# Patient Record
Sex: MALE | Race: WHITE | NOT HISPANIC OR LATINO | Employment: FULL TIME | URBAN - METROPOLITAN AREA
[De-identification: names, ages, dates, MRNs, and addresses within clinical notes are randomized per-mention and may not be internally consistent; named-entity substitution may affect disease eponyms.]

---

## 2017-01-23 ENCOUNTER — OFFICE VISIT (OUTPATIENT)
Dept: INTERNAL MEDICINE | Facility: CLINIC | Age: 42
End: 2017-01-23

## 2017-01-23 VITALS
TEMPERATURE: 98.1 F | OXYGEN SATURATION: 96 % | HEART RATE: 67 BPM | BODY MASS INDEX: 27.7 KG/M2 | DIASTOLIC BLOOD PRESSURE: 80 MMHG | SYSTOLIC BLOOD PRESSURE: 112 MMHG | WEIGHT: 215.8 LBS | HEIGHT: 74 IN | RESPIRATION RATE: 14 BRPM

## 2017-01-23 DIAGNOSIS — K21.9 GASTROESOPHAGEAL REFLUX DISEASE WITHOUT ESOPHAGITIS: Primary | ICD-10-CM

## 2017-01-23 DIAGNOSIS — F41.9 CHRONIC ANXIETY: ICD-10-CM

## 2017-01-23 DIAGNOSIS — M17.32 POST-TRAUMATIC OSTEOARTHRITIS OF LEFT KNEE: ICD-10-CM

## 2017-01-23 DIAGNOSIS — M54.2 NECK PAIN: ICD-10-CM

## 2017-01-23 DIAGNOSIS — G89.21 CHRONIC PAIN DUE TO TRAUMA: ICD-10-CM

## 2017-01-23 PROBLEM — G89.29 CHRONIC PAIN: Status: ACTIVE | Noted: 2017-01-23

## 2017-01-23 PROBLEM — M17.9 OSTEOARTHRITIS, KNEE: Status: ACTIVE | Noted: 2017-01-23

## 2017-01-23 PROCEDURE — 99214 OFFICE O/P EST MOD 30 MIN: CPT | Performed by: INTERNAL MEDICINE

## 2017-01-23 RX ORDER — PANTOPRAZOLE SODIUM 40 MG
40 TABLET, DELAYED RELEASE (ENTERIC COATED) ORAL DAILY
Qty: 90 TABLET | Refills: 1 | Status: SHIPPED | OUTPATIENT
Start: 2017-01-23 | End: 2017-07-18 | Stop reason: SDUPTHER

## 2017-01-23 RX ORDER — FAMOTIDINE 40 MG/1
40 TABLET, FILM COATED ORAL NIGHTLY
Qty: 90 TABLET | Refills: 3 | Status: SHIPPED | OUTPATIENT
Start: 2017-01-23 | End: 2017-10-23

## 2017-01-23 RX ORDER — PANTOPRAZOLE SODIUM 40 MG
1 TABLET, DELAYED RELEASE (ENTERIC COATED) ORAL EVERY MORNING
Refills: 0 | COMMUNITY
Start: 2016-11-16 | End: 2017-01-23

## 2017-01-23 RX ORDER — DULOXETIN HYDROCHLORIDE 30 MG/1
30 CAPSULE, DELAYED RELEASE ORAL DAILY
Qty: 30 CAPSULE | Refills: 1 | Status: SHIPPED | OUTPATIENT
Start: 2017-01-23 | End: 2017-05-04

## 2017-01-23 NOTE — MR AVS SNAPSHOT
Yony Parsons   1/23/2017 4:15 PM   Office Visit    Provider:  Aung Clark MD   Department:  [facility] INTERNAL MEDICINE   Dept Phone:  [phone]                Your Full Care Plan              Today's Medication Changes          These changes are accurate as of: 1/23/17  6:12 PM.  If you have any questions, ask your nurse or doctor.               New Medication(s)Ordered:     DULoxetine 30 MG capsule   Commonly known as:  CYMBALTA   Take 1 capsule by mouth Daily.       famotidine 40 MG tablet   Commonly known as:  PEPCID   Take 1 tablet by mouth Every Night.         Stop taking medication(s)listed here:     lansoprazole 30 MG capsule   Commonly known as:  PREVACID                Where to Get Your Medications      These medications were sent to [facility] - [place], [place] - [id number] - [phone]  - [phone]   [address] [place] [id number]    Hours:  24-hours Phone:  [phone]     DULoxetine 30 MG capsule    famotidine 40 MG tablet                  Your Updated Medication List          This list is accurate as of: 1/23/17  6:12 PM.  Always use your most recent med list.                ALLEGRA ALLERGY 180 MG tablet   Generic drug:  fexofenadine       DULoxetine 30 MG capsule   Commonly known as:  CYMBALTA   Take 1 capsule by mouth Daily.       famotidine 40 MG tablet   Commonly known as:  PEPCID   Take 1 tablet by mouth Every Night.       MENS MULTI VITAMIN & MINERAL PO       montelukast 10 MG tablet   Commonly known as:  SINGULAIR       NASONEX 50 MCG/ACT nasal spray   Generic drug:  mometasone       PROAIR  (90 BASE) MCG/ACT inhaler   Generic drug:  albuterol       PROTONIX 40 MG EC tablet   Generic drug:  pantoprazole       sucralfate 1 G tablet   Commonly known as:  CARAFATE   Take 1 tablet by mouth Every 12 (Twelve) Hours.       TUMS 500 MG chewable tablet   Generic drug:  calcium  "carbonate               You Were Diagnosed With        Codes Comments    Gastroesophageal reflux disease without esophagitis    -  Primary ICD-10-CM: K21.9  ICD-9-CM: 530.81     Post-traumatic osteoarthritis of left knee     ICD-10-CM: M17.32  ICD-9-CM: 715.26       Instructions    1.  Start duloxetine 30 mg each morning - for chronic pain management.    2.  Continue Protonix 40 mg every morning. Take 1/2 tab once or twice weekly as tolerated.    3.  Start famotidine 40 mg at night - for acid suppression.    4.  Use Pepto-Bismol as needed - for indigestion or heartburn.    5.  Use low impact activities - such as cycling and swimming - to protect knee.    6.  Follow-up with orthopedist - for knee pain treatment.    7.  Return in April - fasting checkup.     Patient Instructions History      MyChart Signup     Our records indicate that you have an active [employer] account.    You can view your After Visit Summary by going to [employer] and logging in with your [employer] username and password.  If you don't have a [employer] username and password but a parent or guardian has access to your record, the parent or guardian should login with their own [employer] username and password and access your record to view the After Visit Summary.    If you have questions, you can email [email or web address] or call [phone] to talk to our [employer] staff.  Remember, [employer] is NOT to be used for urgent needs.  For medical emergencies, dial 911.               Other Info from Your Visit           Allergies     No Known Drug Allergy        Reason for Visit     Follow-up           Vital Signs     Blood Pressure Pulse Temperature Respirations Height Weight    112/80 (BP Location: Right arm, Patient Position: Sitting, Cuff Size: Adult) 67 98.1 °F (36.7 °C) (Oral) 14 74\" (188 cm) 215 lb 12.8 oz (97.9 kg)    Oxygen Saturation Body Mass Index Smoking Status             96% 27.71 kg/m2 Former Smoker       "   Problems and Diagnoses Noted     Chronic pain    Acid reflux disease    Degenerative arthritis of knee      No Longer an Issue     Abnormal EKG    Chronic abdominal pain

## 2017-01-23 NOTE — PATIENT INSTRUCTIONS
1.  Start duloxetine 30 mg each morning - for chronic pain management.    2.  Continue Protonix - CHOCO - 40 mg every morning. Take 1/2 tab once or twice weekly as tolerated.    3.  Start famotidine 40 mg at night - for acid suppression.    4.  Use Pepto-Bismol as needed - for indigestion or heartburn.    5.  Use low impact activities - such as cycling and swimming - to protect knee.    6.  Follow-up with orthopedist - for knee pain treatment.    7.  Return in April - fasting checkup.

## 2017-01-23 NOTE — PROGRESS NOTES
Caleb Parsons is a 41 y.o. male.     History of Present Illness     The patient has many years of refractory GERD symptoms.  He's had a remarkable history responding to brand name Protonix but failing multiple other PPIs.  He went through a difficult year last year with relentless pain because his insurance company would not cover the brand name.  He has been on brand name Protonix now for several weeks and is done dramatically better.  He develops GI distress and abdominal pain.  He underwent GI evaluation and received a CT of the abdomen which was negative.  He does not use nonsteroidals and uses very little alcohol.    The patient's had several years progressive left knee pain.  He did undergo a ACL repair in 2008.  He then underwent an ACL repair of the right knee last year.  He has had a long history of sports activities and has had recurring knee injuries.  He now has difficulty with knee pain if he sits up to an hour at a time.  The knees do improve somewhat when he walks.  Nevertheless, by bedtime the knees a chemical great deal and often waking him up at night.  He has had little edema.  He has cut back on high impact activities.  He is not using analgesic medication.    The following portions of the patient's history were reviewed and updated as appropriate: allergies, current medications, past family history, past medical history, past social history, past surgical history and problem list.    Review of Systems   Constitutional: Negative for appetite change and fatigue.   HENT: Negative for ear pain and sore throat.    Respiratory: Negative for cough and shortness of breath.    Cardiovascular: Negative for chest pain and palpitations.   Gastrointestinal: Positive for abdominal distention and abdominal pain. Negative for nausea.   Musculoskeletal: Positive for arthralgias and gait problem. Negative for back pain.   Neurological: Negative for dizziness and headaches.   Psychiatric/Behavioral:  "Positive for sleep disturbance. Negative for dysphoric mood. The patient is nervous/anxious.        Objective   Blood pressure 112/80, pulse 67, temperature 98.1 °F (36.7 °C), temperature source Oral, resp. rate 14, height 74\" (188 cm), weight 215 lb 12.8 oz (97.9 kg), SpO2 96 %.    Physical Exam   Constitutional: He is oriented to person, place, and time.   Cardiovascular: Normal rate, regular rhythm and normal heart sounds.    Pulmonary/Chest: Effort normal and breath sounds normal. He has no wheezes. He has no rales.   Abdominal: Soft. Bowel sounds are normal. He exhibits no distension and no mass.   Moderate epigastric tenderness   Musculoskeletal: Normal range of motion. He exhibits no edema.   Mild tenderness and stiffness of left kidney with good range of motion of both knees all ligaments appear stable   Neurological: He is alert and oriented to person, place, and time. He exhibits normal muscle tone. Coordination normal.   Psychiatric: His behavior is normal. Judgment and thought content normal.   Mildly anxious   Nursing note and vitals reviewed.    Procedures  Assessment/Plan   Yony was seen today for gi problem.    Diagnoses and all orders for this visit:    Gastroesophageal reflux disease without esophagitis    Post-traumatic osteoarthritis of left knee    Chronic anxiety    Chronic pain due to trauma    Other orders  -     DULoxetine (CYMBALTA) 30 MG capsule; Take 1 capsule by mouth Daily.  -     famotidine (PEPCID) 40 MG tablet; Take 1 tablet by mouth Every Night.  -     PROTONIX 40 MG EC tablet; Take 1 tablet by mouth Daily.      The patient's knee pain has become progressively more refractory and inhibiting.  I've asked him to use Tylenol at night although he has failed Tylenol the past.  The patient is not a candidate for nonsteroidals because of his GERD.  Since she is developing increasing fatigue from the chronic pain I've asked him to start Cymbalta.  We may need to adjust the dose from 30 mg " to 60 mg.    The patient's GERD is doing well on Protonix.  I've counseled the patient on long-term safety and asked him to eventually try half tablets a Protonix as tolerated.  He should continue on sucralfate for long-term protection and start famotidine as a safer long-term alternative for symptom control.  It may Take him one or 2 years to get off of Protonix.      Patient Instructions   1.  Start duloxetine 30 mg each morning - for chronic pain management.    2.  Continue Protonix - CHOCO - 40 mg every morning. Take 1/2 tab once or twice weekly as tolerated.    3.  Start famotidine 40 mg at night - for acid suppression.    4.  Use Pepto-Bismol as needed - for indigestion or heartburn.    5.  Use low impact activities - such as cycling and swimming - to protect knee.    6.  Follow-up with orthopedist - for knee pain treatment.    7.  Return in April - fasting checkup.      Electronically signed Aung Clark M.D.1/26/2017 7:01 AM

## 2017-01-26 PROBLEM — F41.9 CHRONIC ANXIETY: Status: ACTIVE | Noted: 2017-01-26

## 2017-02-01 PROBLEM — M54.2 NECK PAIN: Status: ACTIVE | Noted: 2017-02-01

## 2017-02-01 PROBLEM — M54.2 NECK PAIN: Status: RESOLVED | Noted: 2017-02-01 | Resolved: 2017-02-01

## 2017-05-04 ENCOUNTER — OFFICE VISIT (OUTPATIENT)
Dept: INTERNAL MEDICINE | Facility: CLINIC | Age: 42
End: 2017-05-04

## 2017-05-04 VITALS
DIASTOLIC BLOOD PRESSURE: 80 MMHG | SYSTOLIC BLOOD PRESSURE: 112 MMHG | TEMPERATURE: 96.9 F | RESPIRATION RATE: 16 BRPM | HEART RATE: 84 BPM | BODY MASS INDEX: 27.22 KG/M2 | WEIGHT: 212 LBS | OXYGEN SATURATION: 98 %

## 2017-05-04 DIAGNOSIS — M17.32 POST-TRAUMATIC OSTEOARTHRITIS OF LEFT KNEE: ICD-10-CM

## 2017-05-04 DIAGNOSIS — K21.9 GASTROESOPHAGEAL REFLUX DISEASE WITHOUT ESOPHAGITIS: Primary | ICD-10-CM

## 2017-05-04 DIAGNOSIS — R73.01 FASTING HYPERGLYCEMIA: ICD-10-CM

## 2017-05-04 DIAGNOSIS — F41.9 CHRONIC ANXIETY: ICD-10-CM

## 2017-05-04 DIAGNOSIS — K29.50 CHRONIC ANTRAL GASTRITIS: ICD-10-CM

## 2017-05-04 PROBLEM — G89.29 CHRONIC PAIN: Status: RESOLVED | Noted: 2017-01-23 | Resolved: 2017-05-04

## 2017-05-04 LAB
ALBUMIN SERPL-MCNC: 4.6 G/DL (ref 3.2–4.8)
ALBUMIN/GLOB SERPL: 1.5 G/DL (ref 1.5–2.5)
ALP SERPL-CCNC: 53 U/L (ref 25–100)
ALT SERPL-CCNC: 39 U/L (ref 7–40)
AST SERPL-CCNC: 25 U/L (ref 0–33)
BILIRUB SERPL-MCNC: 0.6 MG/DL (ref 0.3–1.2)
BUN SERPL-MCNC: 13 MG/DL (ref 9–23)
BUN/CREAT SERPL: 13 (ref 7–25)
CALCIUM SERPL-MCNC: 10 MG/DL (ref 8.7–10.4)
CHLORIDE SERPL-SCNC: 102 MMOL/L (ref 99–109)
CO2 SERPL-SCNC: 30 MMOL/L (ref 20–31)
CREAT SERPL-MCNC: 1 MG/DL (ref 0.6–1.3)
GLOBULIN SER CALC-MCNC: 3 GM/DL
GLUCOSE SERPL-MCNC: 110 MG/DL (ref 70–100)
HBA1C MFR BLD: 6.2 % (ref 4.8–5.6)
POTASSIUM SERPL-SCNC: 4.8 MMOL/L (ref 3.5–5.5)
PROT SERPL-MCNC: 7.6 G/DL (ref 5.7–8.2)
SODIUM SERPL-SCNC: 140 MMOL/L (ref 132–146)

## 2017-05-04 PROCEDURE — 99214 OFFICE O/P EST MOD 30 MIN: CPT | Performed by: INTERNAL MEDICINE

## 2017-05-10 ENCOUNTER — TELEPHONE (OUTPATIENT)
Dept: INTERNAL MEDICINE | Facility: CLINIC | Age: 42
End: 2017-05-10

## 2017-05-10 DIAGNOSIS — R73.03 PREDIABETES: Primary | ICD-10-CM

## 2017-05-12 ENCOUNTER — PATIENT MESSAGE (OUTPATIENT)
Dept: INTERNAL MEDICINE | Facility: CLINIC | Age: 42
End: 2017-05-12

## 2017-05-24 ENCOUNTER — HOSPITAL ENCOUNTER (OUTPATIENT)
Dept: DIABETES SERVICES | Facility: HOSPITAL | Age: 42
Setting detail: RECURRING SERIES
Discharge: HOME | End: 2017-05-24

## 2017-07-18 RX ORDER — PANTOPRAZOLE SODIUM 40 MG
TABLET, DELAYED RELEASE (ENTERIC COATED) ORAL
Qty: 90 TABLET | Refills: 1 | Status: SHIPPED | OUTPATIENT
Start: 2017-07-18 | End: 2017-10-23 | Stop reason: SDUPTHER

## 2017-08-18 ENCOUNTER — TELEPHONE (OUTPATIENT)
Dept: INTERNAL MEDICINE | Facility: CLINIC | Age: 42
End: 2017-08-18

## 2017-08-18 DIAGNOSIS — Z12.11 COLON CANCER SCREENING: Primary | ICD-10-CM

## 2017-08-18 NOTE — TELEPHONE ENCOUNTER
Pt states he's had maybe 3 diagnostic colonoscopies since having polyps and they've all been clear. Screening colonoscopy auth'd per [employer]. Pt notified an order will be put in and faxed to Dr Hooker.

## 2017-08-18 NOTE — TELEPHONE ENCOUNTER
----- Message from Maxime Luna sent at 8/18/2017 11:14 AM EDT -----  Contact: BECKY  REFERRAL REQUEST:  BECKY HAS SWITCHED INSURANCE TO [employer] PPO AND ALTHOUGH HE CAN MAKE HIS OWN APPT FOR A COLONOSCOPY (FOR HIM IT IS A DIAGNOSTIC, HE HAD POLYPS 10-12 YRS AGO), HE WOULD LIKE A SCREENING COLONOSCOPY- WITH DR FIELDS.  THIS WAY HIS INSURANCE WILL PAY FOR IT. IS THIS SOMETHING [facility] CAN ORDER FOR HIM.  BECKY [phone]

## 2017-10-23 ENCOUNTER — OFFICE VISIT (OUTPATIENT)
Dept: INTERNAL MEDICINE | Facility: CLINIC | Age: 42
End: 2017-10-23

## 2017-10-23 ENCOUNTER — LAB REQUISITION (OUTPATIENT)
Dept: LAB | Facility: HOSPITAL | Age: 42
End: 2017-10-23

## 2017-10-23 DIAGNOSIS — N42.9 DISORDER OF PROSTATE: ICD-10-CM

## 2017-10-23 DIAGNOSIS — Z00.00 ROUTINE GENERAL MEDICAL EXAMINATION AT A HEALTH CARE FACILITY: ICD-10-CM

## 2017-10-23 DIAGNOSIS — R07.89 CHEST TIGHTNESS: ICD-10-CM

## 2017-10-23 DIAGNOSIS — F41.9 CHRONIC ANXIETY: ICD-10-CM

## 2017-10-23 DIAGNOSIS — J30.2 CHRONIC SEASONAL ALLERGIC RHINITIS, UNSPECIFIED TRIGGER: ICD-10-CM

## 2017-10-23 DIAGNOSIS — Z00.00 PREVENTATIVE HEALTH CARE: Primary | ICD-10-CM

## 2017-10-23 DIAGNOSIS — R73.01 FASTING HYPERGLYCEMIA: ICD-10-CM

## 2017-10-23 DIAGNOSIS — K21.9 GASTROESOPHAGEAL REFLUX DISEASE WITHOUT ESOPHAGITIS: ICD-10-CM

## 2017-10-23 DIAGNOSIS — Z12.11 COLON CANCER SCREENING: ICD-10-CM

## 2017-10-23 DIAGNOSIS — K58.9 IRRITABLE BOWEL SYNDROME WITHOUT DIARRHEA: ICD-10-CM

## 2017-10-23 DIAGNOSIS — K29.50 CHRONIC ANTRAL GASTRITIS: ICD-10-CM

## 2017-10-23 DIAGNOSIS — R53.82 CHRONIC FATIGUE: ICD-10-CM

## 2017-10-23 PROCEDURE — 99396 PREV VISIT EST AGE 40-64: CPT | Performed by: INTERNAL MEDICINE

## 2017-10-23 PROCEDURE — 93000 ELECTROCARDIOGRAM COMPLETE: CPT | Performed by: INTERNAL MEDICINE

## 2017-10-23 PROCEDURE — 36415 COLL VENOUS BLD VENIPUNCTURE: CPT | Performed by: INTERNAL MEDICINE

## 2017-10-23 PROCEDURE — 99214 OFFICE O/P EST MOD 30 MIN: CPT | Performed by: INTERNAL MEDICINE

## 2017-10-23 RX ORDER — FAMOTIDINE 40 MG/1
20 TABLET, FILM COATED ORAL NIGHTLY
Qty: 90 TABLET | Refills: 3
Start: 2017-10-23 | End: 2019-10-17

## 2017-10-23 RX ORDER — PANTOPRAZOLE SODIUM 40 MG
40 TABLET, DELAYED RELEASE (ENTERIC COATED) ORAL DAILY
Qty: 90 TABLET | Refills: 1
Start: 2017-10-23 | End: 2017-11-01 | Stop reason: SDUPTHER

## 2017-10-23 RX ORDER — SUCRALFATE 1 G/1
0.5 TABLET ORAL EVERY 12 HOURS
Qty: 90 TABLET | Refills: 3 | Status: SHIPPED | OUTPATIENT
Start: 2017-10-23 | End: 2019-07-31 | Stop reason: SDUPTHER

## 2017-10-23 NOTE — PROGRESS NOTES
Caleb Parsons is a 42 y.o. male.     Chief Complaint   Patient presents with   • Annual Exam   • Heartburn       History of Present Illness     The patient has an 11 year history of severe refractory GERD.  He has failed all medications over the years except for brand name Protonix.  With great difficulty this has been available through the preauthorization.  He does remarkably better on this product and has had minimal symptoms in recent months.  He had a severe flare earlier this year.  His condition is complicated by antral gastritis and irritable bowel syndrome.  He has avoided fried foods and spicy foods which minimize his GI distress.    The following portions of the patient's history were reviewed and updated as appropriate: allergies, current medications, past family history, past medical history, past social history, past surgical history and problem list.    Active Ambulatory Problems     Diagnosis Date Noted   • Atopic rhinitis 08/16/2016   • Chronic antral gastritis 08/16/2016   • Gastroesophageal reflux disease 08/16/2016   • Irritable bowel syndrome 08/16/2016   • Disorder of prostate 08/16/2016   • Preventative health care 08/18/2016   • Osteoarthritis, knee 01/23/2017   • Chronic anxiety 01/26/2017   • Fasting hyperglycemia 05/04/2017     Resolved Ambulatory Problems     Diagnosis Date Noted   • Abnormal electrocardiogram 08/16/2016   • Serum creatinine raised 08/16/2016   • Detrusor muscle hypertonia 08/16/2016   • Pain in pelvis 08/16/2016   • Chronic abdominal pain 10/17/2016   • Chronic pain 01/23/2017   • Neck pain 02/01/2017     Past Medical History:   Diagnosis Date   • Allergic rhinitis    • Anal fissure 2006   • Chronic gastric erosion    • Colon adenomas 2007   • GERD (gastroesophageal reflux disease) 2006   • IBS (irritable bowel syndrome) 2006   • Overactive bladder 2013   • Pelvic pain    • Prediabetes 2017     Past Surgical History:   Procedure Laterality Date   •  APPENDECTOMY  1992   • CHOLECYSTECTOMY  2006   • COLONOSCOPY W/ POLYPECTOMY  2007    With rectal adenoma   • KNEE ARTHROSCOPY Right 2016    ACL repair   • KNEE SURGERY Left 2008    ACL repair     Family History   Problem Relation Age of Onset   • Diabetes type II Mother 60   • Prostate cancer Maternal Grandfather    • No Known Problems Father    • No Known Problems Sister    • No Known Problems Brother    • Lung cancer Paternal Grandfather      Social History     Social History   • Marital status:      Spouse name: N/A   • Number of children: N/A   • Years of education: N/A     Occupational History   • Not on file.     Social History Main Topics   • Smoking status: Former Smoker     Packs/day: 0.10     Years: 10.00     Types: Cigarettes     Start date: 1997     Quit date: 2007   • Smokeless tobacco: Never Used   • Alcohol use 3.6 oz/week     6 Shots of liquor per week   • Drug use: No   • Sexual activity: Not on file     Other Topics Concern   • Not on file     Social History Narrative    Domestic life     lives with wife and daughter        Taoist    Evangelical        Sleep hygiene   in bed 11 PM to 7 AM for 7 hours broken sleep         Caffeine use   1 cup of coffee or tea daily        Exercise habits    Upper body strengthening.  Elliptical Glider 3 days weekly        Diet     low-calorie diabetic diet - low in salt low in sugar -  yogurt daily        Occupation      - 50 hours weekly        Hearing  no  impairment        Vision    corrects with reading glasses         Driving    no limitation             Review of Systems   Constitutional: Negative for appetite change and fatigue.   HENT: Positive for congestion and sneezing. Negative for ear pain and sore throat.    Eyes: Negative for itching and visual disturbance.   Respiratory: Positive for chest tightness. Negative for cough and shortness of breath.    Cardiovascular: Negative for chest pain and palpitations.   Gastrointestinal:  "Negative for abdominal pain and nausea.   Endocrine: Negative for cold intolerance and heat intolerance.   Genitourinary: Negative for dysuria and hematuria.   Musculoskeletal: Negative for arthralgias and back pain.   Skin: Negative for rash and wound.   Allergic/Immunologic: Negative for environmental allergies and food allergies.   Neurological: Negative for dizziness and headaches.   Hematological: Negative for adenopathy. Does not bruise/bleed easily.   Psychiatric/Behavioral: Positive for sleep disturbance. The patient is nervous/anxious.        Objective   Blood pressure 110/80, pulse 64, temperature 97.2 °F (36.2 °C), resp. rate 12, height 74\" (188 cm), weight 197 lb (89.4 kg), SpO2 98 %.    Physical Exam   Constitutional: He is oriented to person, place, and time. He appears well-developed and well-nourished. No distress.   HENT:   Right Ear: External ear normal.   Left Ear: External ear normal.   Nose: Nose normal.   Mouth/Throat: Oropharynx is clear and moist.   Eyes: EOM are normal. Pupils are equal, round, and reactive to light. No scleral icterus.   Neck: Normal range of motion. Neck supple. No JVD present. No thyromegaly present.   Cardiovascular: Normal rate, regular rhythm, normal heart sounds and intact distal pulses.    No murmur heard.  Pulmonary/Chest: Effort normal and breath sounds normal. He has no wheezes. He has no rales.   Abdominal: Soft. Bowel sounds are normal. He exhibits no distension and no mass. No hernia.   Mild epigastric tenderness   Genitourinary: Rectum normal, prostate normal and penis normal.   Genitourinary Comments: Testicles normal   Musculoskeletal: Normal range of motion. He exhibits no edema or tenderness.   Lymphadenopathy:     He has no cervical adenopathy.   Neurological: He is alert and oriented to person, place, and time. He displays normal reflexes. No cranial nerve deficit. He exhibits normal muscle tone. Coordination normal.   Vibratory normal  Romberg " negative  Gait normal  Plantars downgoing     Skin: Skin is warm and dry. No rash noted.   Psychiatric: He has a normal mood and affect. His behavior is normal. Judgment and thought content normal.   Mildly anxious   Nursing note and vitals reviewed.      ECG 12 Lead  Date/Time: 10/23/2017 8:40 AM  Performed by: AUNG CLARK  Authorized by: AUNG CLARK   Interpreted by ED physician: Aung Clark M.D.  Comparison: compared with previous ECG from 10/17/2016  Similar to previous ECG  Rhythm: sinus rhythm  Rate: normal  BPM: 50  Conduction: conduction normal  ST Segments: ST segments normal  T Waves: T waves normal  QRS axis: normal  Other: no other findings  Clinical impression: normal ECG  Comments: Indication - chest tightness  Baseline EKG          Assessment/Plan   Yony was seen today for annual exam and heartburn.    Diagnoses and all orders for this visit:    Preventative health care    Chronic anxiety    Disorder of prostate  -     Urinalysis With / Microscopic If Indicated - Urine, Clean Catch    Fasting hyperglycemia  -     Hemoglobin A1c    Irritable bowel syndrome without diarrhea  -     CBC & Differential  -     Comprehensive Metabolic Panel  -     C-reactive Protein    Gastroesophageal reflux disease without esophagitis    Chronic antral gastritis    Chronic seasonal allergic rhinitis, unspecified trigger    Chronic fatigue  -     Testosterone  -     TSH    Chest tightness  -     ECG 12 Lead    Colon cancer screening  -     Ambulatory Referral For Screening Colonoscopy    Other orders  -     sucralfate (CARAFATE) 1 g tablet; Take 0.5 tablets by mouth Every 12 (Twelve) Hours.  -     famotidine (PEPCID) 40 MG tablet; Take 0.5 tablets by mouth Every Night.  -     PROTONIX 40 MG EC tablet; Take 1 tablet by mouth Daily.      The patient's GERD is in remission in recent months.  He should gradually reduce Protonix dose to 20 mg for long-term safety.  I reaffirmed his need for famotidine and  sucralfate for ancillary treatment which will minimize his reliance on PPIs.    The patient has been prediabetic in over the last year and he has undergone dietary counseling.  His weight is down 15 pounds this year and his BMI is dropped from 6.2-5.1.  I've counseled him on excellent control of his diet and weight through his lifetime prevent diabetic complications.    The patient has severe refractory respiratory allergies.  He is done much better in the last year greatly related to environmental control.  He will need to keep Singulair and albuterol in reserve for severe allergy flares.  Nasonex and Allegra will be excellent first-line treatment.    The patient has had colon and rectal adenomas at a young age.  He is ready now for a 5 year repeat in colonoscopy.    The preventive exam has been reviewed in detail.  The patient has been fully counseled on preventative guidelines for vaccines, cancer screenings, and other health maintenance needs.   The patient has been counseled on guidelines for maintaining a lifestyle to promote good health and to minimize chronic diseases.  The patient has been assisted with scheduling these healthcare procedures for the coming year and given a written document outlining these recommendations.    Patient Instructions   1.  Limit Protonix - no more than  20 mg daily - as much as possible.    2.  Resume famotidine 20 mg daily - for stomach protection.    3.  Resume sucralfate 500 mg every 12 hours - for stomach protection.    4.  Follow a diabetic diet - low in salt, low in sugar, low in fat in spices.    5.  Maintain a low impact exercise program = every week.    6.  Colonoscopy this fall - for cancer screening.    7.  Return visit 6 months - fasting checkup.    8.  All laboratory tests are acceptable and require no change in treatment.      Current Outpatient Prescriptions:   •  calcium carbonate (TUMS) 500 MG chewable tablet, Chew 1 tablet As Needed., Disp: , Rfl:   •   famotidine (PEPCID) 40 MG tablet, Take 0.5 tablets by mouth Every Night., Disp: 90 tablet, Rfl: 3  •  fexofenadine (ALLEGRA ALLERGY) 180 MG tablet, Take 1 tablet by mouth Daily As Needed., Disp: , Rfl:   •  mometasone (NASONEX) 50 MCG/ACT nasal spray, 1 spray into each nostril Daily As Needed., Disp: , Rfl:   •  montelukast (SINGULAIR) 10 MG tablet, Take 1 tablet by mouth Daily As Needed. During allergy season, Disp: , Rfl:   •  Multiple Vitamins-Minerals (MENS MULTI VITAMIN & MINERAL PO), Take 1 tablet by mouth Every Morning., Disp: , Rfl:   •  PROTONIX 40 MG EC tablet, Take 1 tablet by mouth Daily., Disp: 90 tablet, Rfl: 1  •  sucralfate (CARAFATE) 1 g tablet, Take 0.5 tablets by mouth Every 12 (Twelve) Hours., Disp: 90 tablet, Rfl: 3    Allergies   Allergen Reactions   • No Known Drug Allergy        Immunization History   Administered Date(s) Administered   • Influenza, Quadrivalent 09/30/2015, 09/03/2016   • Tdap 10/15/2014       Electronically signed Aung Clark M.D.10/24/2017 6:53 AM

## 2017-10-23 NOTE — PATIENT INSTRUCTIONS
1.  Limit Protonix - no more than  20 mg daily - as much as possible.    2.  Resume famotidine 20 mg daily - for stomach protection.    3.  Resume sucralfate 500 mg every 12 hours - for stomach protection.    4.  Follow a diabetic diet - low in salt, low in sugar, low in fat in spices.    5.  Maintain a low impact exercise program = every week.    6.  Colonoscopy this fall - for cancer screening.    7.  Return visit 6 months - fasting checkup.

## 2017-10-24 VITALS
TEMPERATURE: 97.2 F | HEIGHT: 74 IN | HEART RATE: 64 BPM | SYSTOLIC BLOOD PRESSURE: 110 MMHG | RESPIRATION RATE: 12 BRPM | DIASTOLIC BLOOD PRESSURE: 80 MMHG | OXYGEN SATURATION: 98 % | WEIGHT: 197 LBS | BODY MASS INDEX: 25.28 KG/M2

## 2017-10-24 LAB
ALBUMIN SERPL-MCNC: 4.3 G/DL (ref 3.2–4.8)
ALBUMIN/GLOB SERPL: 1.5 G/DL (ref 1.5–2.5)
ALP SERPL-CCNC: 43 U/L (ref 25–100)
ALT SERPL-CCNC: 25 U/L (ref 7–40)
APPEARANCE UR: CLEAR
AST SERPL-CCNC: 25 U/L (ref 0–33)
BASOPHILS # BLD AUTO: 0.05 10*3/MM3 (ref 0–0.2)
BASOPHILS NFR BLD AUTO: 0.9 % (ref 0–1)
BILIRUB SERPL-MCNC: 0.7 MG/DL (ref 0.3–1.2)
BILIRUB UR QL STRIP: NEGATIVE
BUN SERPL-MCNC: 17 MG/DL (ref 9–23)
BUN/CREAT SERPL: 17 (ref 7–25)
CALCIUM SERPL-MCNC: 9 MG/DL (ref 8.7–10.4)
CHLORIDE SERPL-SCNC: 99 MMOL/L (ref 99–109)
CO2 SERPL-SCNC: 28 MMOL/L (ref 20–31)
COLOR UR: YELLOW
CREAT SERPL-MCNC: 1 MG/DL (ref 0.6–1.3)
CRP SERPL-MCNC: 0.01 MG/DL (ref 0–1)
EOSINOPHIL # BLD AUTO: 0.08 10*3/MM3 (ref 0–0.3)
EOSINOPHIL NFR BLD AUTO: 1.4 % (ref 0–3)
ERYTHROCYTE [DISTWIDTH] IN BLOOD BY AUTOMATED COUNT: 13.5 % (ref 11.3–14.5)
GFR SERPLBLD CREATININE-BSD FMLA CKD-EPI: 82 ML/MIN/1.73
GFR SERPLBLD CREATININE-BSD FMLA CKD-EPI: 99 ML/MIN/1.73
GLOBULIN SER CALC-MCNC: 2.8 GM/DL
GLUCOSE SERPL-MCNC: 95 MG/DL (ref 70–100)
GLUCOSE UR QL: NEGATIVE
HBA1C MFR BLD: 5.1 % (ref 4.8–5.6)
HCT VFR BLD AUTO: 43.3 % (ref 38.9–50.9)
HGB BLD-MCNC: 14.4 G/DL (ref 13.1–17.5)
HGB UR QL STRIP: NEGATIVE
IMM GRANULOCYTES # BLD: 0.01 10*3/MM3 (ref 0–0.03)
IMM GRANULOCYTES NFR BLD: 0.2 % (ref 0–0.6)
KETONES UR QL STRIP: NEGATIVE
LEUKOCYTE ESTERASE UR QL STRIP: NEGATIVE
LYMPHOCYTES # BLD AUTO: 2.31 10*3/MM3 (ref 0.6–4.8)
LYMPHOCYTES NFR BLD AUTO: 39.7 % (ref 24–44)
MCH RBC QN AUTO: 31.5 PG (ref 27–31)
MCHC RBC AUTO-ENTMCNC: 33.3 G/DL (ref 32–36)
MCV RBC AUTO: 94.7 FL (ref 80–99)
MONOCYTES # BLD AUTO: 0.57 10*3/MM3 (ref 0–1)
MONOCYTES NFR BLD AUTO: 9.8 % (ref 0–12)
NEUTROPHILS # BLD AUTO: 2.8 10*3/MM3 (ref 1.5–8.3)
NEUTROPHILS NFR BLD AUTO: 48 % (ref 41–71)
NITRITE UR QL STRIP: NEGATIVE
PH UR STRIP: 6.5 [PH] (ref 5–8)
PLATELET # BLD AUTO: 208 10*3/MM3 (ref 150–450)
POTASSIUM SERPL-SCNC: 4.6 MMOL/L (ref 3.5–5.5)
PROT SERPL-MCNC: 7.1 G/DL (ref 5.7–8.2)
PROT UR QL STRIP: NEGATIVE
RBC # BLD AUTO: 4.57 10*6/MM3 (ref 4.2–5.76)
SODIUM SERPL-SCNC: 132 MMOL/L (ref 132–146)
SP GR UR: 1.01 (ref 1–1.03)
TESTOST SERPL-MCNC: 436 NG/DL (ref 264–916)
TSH SERPL DL<=0.005 MIU/L-ACNC: 1.07 MIU/ML (ref 0.35–5.35)
UROBILINOGEN UR STRIP-MCNC: NORMAL MG/DL
WBC # BLD AUTO: 5.82 10*3/MM3 (ref 3.5–10.8)

## 2017-10-25 ENCOUNTER — TELEPHONE (OUTPATIENT)
Dept: INTERNAL MEDICINE | Facility: CLINIC | Age: 42
End: 2017-10-25

## 2017-11-01 ENCOUNTER — TELEPHONE (OUTPATIENT)
Dept: INTERNAL MEDICINE | Facility: CLINIC | Age: 42
End: 2017-11-01

## 2017-11-01 RX ORDER — PANTOPRAZOLE SODIUM 40 MG
40 TABLET, DELAYED RELEASE (ENTERIC COATED) ORAL DAILY
Qty: 90 TABLET | Refills: 1 | Status: SHIPPED | OUTPATIENT
Start: 2017-11-01 | End: 2018-05-14 | Stop reason: SDUPTHER

## 2017-11-01 NOTE — TELEPHONE ENCOUNTER
----- Message from Maxime Luna sent at 11/1/2017  9:44 AM EDT -----  Contact: Sinan  Med Renewal:  Protonics 40 mg (has to be name brand) 90 day with 1 refill.  He as new insurance).  [facility].  [facility] [phone]

## 2018-04-24 ENCOUNTER — OFFICE VISIT (OUTPATIENT)
Dept: INTERNAL MEDICINE | Facility: CLINIC | Age: 43
End: 2018-04-24

## 2018-04-24 VITALS
BODY MASS INDEX: 25.16 KG/M2 | WEIGHT: 196 LBS | TEMPERATURE: 98.5 F | HEART RATE: 68 BPM | OXYGEN SATURATION: 97 % | SYSTOLIC BLOOD PRESSURE: 96 MMHG | DIASTOLIC BLOOD PRESSURE: 70 MMHG | RESPIRATION RATE: 16 BRPM

## 2018-04-24 DIAGNOSIS — K58.9 IRRITABLE BOWEL SYNDROME WITHOUT DIARRHEA: ICD-10-CM

## 2018-04-24 DIAGNOSIS — R73.01 FASTING HYPERGLYCEMIA: ICD-10-CM

## 2018-04-24 DIAGNOSIS — K21.9 GASTROESOPHAGEAL REFLUX DISEASE WITHOUT ESOPHAGITIS: ICD-10-CM

## 2018-04-24 DIAGNOSIS — F41.9 CHRONIC ANXIETY: ICD-10-CM

## 2018-04-24 DIAGNOSIS — K29.50 CHRONIC ANTRAL GASTRITIS: ICD-10-CM

## 2018-04-24 DIAGNOSIS — J30.2 CHRONIC SEASONAL ALLERGIC RHINITIS, UNSPECIFIED TRIGGER: Primary | ICD-10-CM

## 2018-04-24 PROCEDURE — 99214 OFFICE O/P EST MOD 30 MIN: CPT | Performed by: INTERNAL MEDICINE

## 2018-04-24 NOTE — PROGRESS NOTES
Caleb Parsons is a 43 y.o. male.     History of Present Illness     The patient's had a greater than 10 year history of severe recurring GERD.  His GERD is responded to brand name Protonix.  He has required preauthorization for this product since all other generic and brand name products failed.  She has been much more comfortable last 12 months.  He has associated degree of recurring gastritis and irritable bowel syndrome.  He has been following FODMAP guidelines with some success.    The following portions of the patient's history were reviewed and updated as appropriate: allergies, current medications, past family history, past medical history, past social history, past surgical history and problem list.    Review of Systems   Constitutional: Negative for appetite change and fatigue.   HENT: Positive for congestion and sneezing. Negative for ear pain and sore throat.    Respiratory: Negative for cough and shortness of breath.    Cardiovascular: Negative for chest pain and palpitations.   Gastrointestinal: Positive for abdominal distention and abdominal pain. Negative for nausea.   Musculoskeletal: Negative for arthralgias and back pain.   Neurological: Negative for dizziness and headaches.       Objective   Blood pressure 96/70, pulse 68, temperature 98.5 °F (36.9 °C), temperature source Oral, resp. rate 16, weight 88.9 kg (196 lb), SpO2 97 %.    Physical Exam   Constitutional: He is oriented to person, place, and time. He appears well-developed and well-nourished. No distress.   HENT:   Right Ear: External ear normal.   Left Ear: External ear normal.   Mouth/Throat: Oropharynx is clear and moist.   Cardiovascular: Normal rate, regular rhythm and normal heart sounds.    Pulmonary/Chest: Effort normal and breath sounds normal. He has no wheezes. He has no rales.   Neurological: He is alert and oriented to person, place, and time. He exhibits normal muscle tone. Coordination normal.   Psychiatric: He has  a normal mood and affect. His behavior is normal. Judgment and thought content normal.   Nursing note and vitals reviewed.    Procedures  Assessment/Plan   Yony was seen today for preventative.    Diagnoses and all orders for this visit:    Chronic seasonal allergic rhinitis, unspecified trigger    Chronic antral gastritis    Gastroesophageal reflux disease without esophagitis  -     Comprehensive Metabolic Panel    Irritable bowel syndrome without diarrhea    Fasting hyperglycemia  -     Hemoglobin A1c    Chronic anxiety      The patient has severe refractory GERD which has stabilized the last year or on Protonix 40 mg daily.  For long-term safety concerns I've asked him to gradually use Protonix 20 mg a few days weekly and finds his lowest effective dose.  He should continue on ranitidine and sucralfate to minimize Protonix use.     Patient is prediabetic with a hemoglobin A1c a year ago of 6.2%.  He still has fasting hyperglycemia but has normalized hemoglobin A1c's on a diabetic diet and weight control.    The patient has significant seasonal allergic rhinitis.  I've asked him to use [place] cast consistently through active seasons.  He should use Allegra and Nasonex as needed.    Patient Instructions   1.  Proceed with gradual wean of Protonix - 20 mg daily.    2.  Follow well-balanced diabetic diet - maintain current weight.    3.  Maintain a regular exercise program - every week.    4.  Continue usual medicines and supplements - as listed.    5.  Return visit 6 months - annual checkup.    6.  Hemoglobin A1c remains normal at 5.6%.    7.  Return panel is normal except for mild elevation in glucose.    Electronically signed Aung Clark M.D.4/26/2018 7:32 PM

## 2018-04-24 NOTE — PATIENT INSTRUCTIONS
1.  Proceed with gradual wean of Protonix - 20 mg daily.    2.  Follow well-balanced diabetic diet - maintain current weight.    3.  Maintain a regular exercise program - every week.    4.  Continue usual medicines and supplements - as listed.    5.  Return visit 6 months - annual checkup.

## 2018-04-26 ENCOUNTER — APPOINTMENT (OUTPATIENT)
Dept: LAB | Facility: HOSPITAL | Age: 43
End: 2018-04-26

## 2018-04-26 LAB
ALBUMIN SERPL-MCNC: 4.3 G/DL (ref 3.2–4.8)
ALBUMIN/GLOB SERPL: 1.5 G/DL (ref 1.5–2.5)
ALP SERPL-CCNC: 43 U/L (ref 25–100)
ALT SERPL W P-5'-P-CCNC: 29 U/L (ref 7–40)
ANION GAP SERPL CALCULATED.3IONS-SCNC: 6 MMOL/L (ref 3–11)
AST SERPL-CCNC: 32 U/L (ref 0–33)
BILIRUB SERPL-MCNC: 0.8 MG/DL (ref 0.3–1.2)
BUN BLD-MCNC: 14 MG/DL (ref 9–23)
BUN/CREAT SERPL: 14 (ref 7–25)
CALCIUM SPEC-SCNC: 9 MG/DL (ref 8.7–10.4)
CHLORIDE SERPL-SCNC: 103 MMOL/L (ref 99–109)
CO2 SERPL-SCNC: 30 MMOL/L (ref 20–31)
CREAT BLD-MCNC: 1 MG/DL (ref 0.6–1.3)
GFR SERPL CREATININE-BSD FRML MDRD: 81 ML/MIN/1.73
GLOBULIN UR ELPH-MCNC: 2.8 GM/DL
GLUCOSE BLD-MCNC: 101 MG/DL (ref 70–100)
HBA1C MFR BLD: 5.6 % (ref 4.8–5.6)
POTASSIUM BLD-SCNC: 4.5 MMOL/L (ref 3.5–5.5)
PROT SERPL-MCNC: 7.1 G/DL (ref 5.7–8.2)
SODIUM BLD-SCNC: 139 MMOL/L (ref 132–146)

## 2018-04-26 PROCEDURE — 83036 HEMOGLOBIN GLYCOSYLATED A1C: CPT | Performed by: INTERNAL MEDICINE

## 2018-04-26 PROCEDURE — 80053 COMPREHEN METABOLIC PANEL: CPT | Performed by: INTERNAL MEDICINE

## 2018-04-26 PROCEDURE — 36415 COLL VENOUS BLD VENIPUNCTURE: CPT | Performed by: INTERNAL MEDICINE

## 2018-04-30 ENCOUNTER — TELEPHONE (OUTPATIENT)
Dept: INTERNAL MEDICINE | Facility: CLINIC | Age: 43
End: 2018-04-30

## 2018-04-30 NOTE — TELEPHONE ENCOUNTER
Left message re: labs.  Per TGF - Hemoglobin A1c remains normal at 5.6%. Chem panel is normal except for mild elevation in glucose.  Enc to call if questions or concerns.

## 2018-05-14 RX ORDER — PANTOPRAZOLE SODIUM 40 MG
TABLET, DELAYED RELEASE (ENTERIC COATED) ORAL
Qty: 90 TABLET | Refills: 1 | Status: SHIPPED | OUTPATIENT
Start: 2018-05-14 | End: 2018-10-29 | Stop reason: SDUPTHER

## 2018-05-22 ENCOUNTER — TELEPHONE (OUTPATIENT)
Dept: INTERNAL MEDICINE | Facility: CLINIC | Age: 43
End: 2018-05-22

## 2018-05-22 ENCOUNTER — OFFICE VISIT (OUTPATIENT)
Dept: INTERNAL MEDICINE | Facility: CLINIC | Age: 43
End: 2018-05-22

## 2018-05-22 VITALS
BODY MASS INDEX: 24.73 KG/M2 | WEIGHT: 192.6 LBS | DIASTOLIC BLOOD PRESSURE: 70 MMHG | TEMPERATURE: 97.7 F | SYSTOLIC BLOOD PRESSURE: 100 MMHG | OXYGEN SATURATION: 98 % | RESPIRATION RATE: 16 BRPM | HEART RATE: 72 BPM

## 2018-05-22 DIAGNOSIS — B02.9 HERPES ZOSTER WITHOUT COMPLICATION: Primary | ICD-10-CM

## 2018-05-22 PROBLEM — M79.2 NEURALGIA: Status: ACTIVE | Noted: 2018-05-22

## 2018-05-22 PROCEDURE — 99213 OFFICE O/P EST LOW 20 MIN: CPT | Performed by: INTERNAL MEDICINE

## 2018-05-22 RX ORDER — GABAPENTIN 100 MG/1
200-300 CAPSULE ORAL NIGHTLY
Qty: 30 CAPSULE | Refills: 3 | OUTPATIENT
Start: 2018-05-22 | End: 2018-10-29

## 2018-05-22 RX ORDER — VALACYCLOVIR HYDROCHLORIDE 1 G/1
1000 TABLET, FILM COATED ORAL 3 TIMES DAILY
Qty: 15 TABLET | Refills: 0 | Status: SHIPPED | OUTPATIENT
Start: 2018-05-22 | End: 2023-02-28

## 2018-05-22 NOTE — TELEPHONE ENCOUNTER
Pt is calling feels most sure he has a case of the shingles - would like to come see tgf to to make 100 % sure and get on meds asap - or can we just call something in?

## 2018-05-22 NOTE — PATIENT INSTRUCTIONS
1.  Start valacyclovir every 8 hours for 15 doses.    2.  Take gabapentin 200-300 mg at bedtime to treat neuralgia.    3.  Take gabapentin 1 or 2 capsules twice daily as needed for neuralgia.    4.  Use Tylenol as needed for pain.    5.  Next checkup as scheduled.

## 2018-05-22 NOTE — PROGRESS NOTES
Subjective   Yony Parsons is a 43 y.o. male.     History of Present Illness     The patient's had 3 days burning pain and 24 hours of blistery rash on the right lower back.  He is sure he has the shingles.  He was uncomfortable through much of the night last night.      The following portions of the patient's history were reviewed and updated as appropriate: allergies, current medications, past family history, past medical history, past social history, past surgical history and problem list.    Review of Systems   Constitutional: Negative for appetite change and fatigue.   HENT: Negative for ear pain and sore throat.    Respiratory: Negative for cough and shortness of breath.    Cardiovascular: Negative for chest pain and palpitations.   Gastrointestinal: Negative for abdominal pain and nausea.   Musculoskeletal: Negative for arthralgias and back pain.   Neurological: Negative for dizziness and headaches.       Objective   Blood pressure 100/70, pulse 72, temperature 97.7 °F (36.5 °C), temperature source Oral, resp. rate 16, weight 87.4 kg (192 lb 9.6 oz), SpO2 98 %.    Physical Exam   Constitutional: He is oriented to person, place, and time. He appears well-developed and well-nourished.   Neurological: He is alert and oriented to person, place, and time. He exhibits normal muscle tone. Coordination normal.   Skin: Skin is warm and dry.   The patient has clusters of typical shingles on the right lower back and flank in the T10 and T12 dermatomes.   Psychiatric: He has a normal mood and affect. Judgment and thought content normal.   Vitals reviewed.    Procedures  Assessment/Plan   Yony was seen today for rash.    Diagnoses and all orders for this visit:    Herpes zoster without complication    Other orders  -     valACYclovir (VALTREX) 1000 MG tablet; Take 1 tablet by mouth 3 (Three) Times a Day.  -     gabapentin (NEURONTIN) 100 MG capsule; Take 2-3 capsules by mouth Every Night. + 1-2 capsules twice daily for  neuralgia       the patient does have blistery rash in the right lower back consistent with acute shingles.  Remarkably, it is president to [employer]ol[employer].      He has had moderate neuralgia and sleep disturbance and I've encouraged him to use gabapentin over the next few weeks as needed.     Patient Instructions   1.  Start valacyclovir every 8 hours for 15 doses.    2.  Take gabapentin 200-300 mg at bedtime to treat neuralgia.    3.  Take gabapentin 1 or 2 capsules twice daily as needed for neuralgia.    4.  Use Tylenol as needed for pain.    5.  Next checkup as scheduled.    Electronically signed Aung Clark M.D.5/24/2018 8:02 PM

## 2018-05-22 NOTE — TELEPHONE ENCOUNTER
I called pt. He said starting Fri it felt like a pulled muscle at his right lower back. Clustery rashes developed yesterday. Per TGF, pt to come to office now for assessment.

## 2018-05-24 RX ORDER — PANTOPRAZOLE SODIUM 40 MG
TABLET, DELAYED RELEASE (ENTERIC COATED) ORAL
Qty: 90 TABLET | Refills: 1 | OUTPATIENT
Start: 2018-05-24

## 2018-10-29 ENCOUNTER — OFFICE VISIT (OUTPATIENT)
Dept: FAMILY MEDICINE CLINIC | Facility: CLINIC | Age: 43
End: 2018-10-29

## 2018-10-29 VITALS
HEIGHT: 74 IN | DIASTOLIC BLOOD PRESSURE: 69 MMHG | BODY MASS INDEX: 24.38 KG/M2 | SYSTOLIC BLOOD PRESSURE: 100 MMHG | HEART RATE: 74 BPM | WEIGHT: 190 LBS | OXYGEN SATURATION: 98 %

## 2018-10-29 DIAGNOSIS — Z00.00 PREVENTATIVE HEALTH CARE: ICD-10-CM

## 2018-10-29 DIAGNOSIS — K21.9 GASTROESOPHAGEAL REFLUX DISEASE WITHOUT ESOPHAGITIS: Primary | ICD-10-CM

## 2018-10-29 DIAGNOSIS — R73.01 FASTING HYPERGLYCEMIA: ICD-10-CM

## 2018-10-29 DIAGNOSIS — J30.2 SEASONAL ALLERGIC RHINITIS, UNSPECIFIED TRIGGER: ICD-10-CM

## 2018-10-29 PROCEDURE — 99203 OFFICE O/P NEW LOW 30 MIN: CPT | Performed by: FAMILY MEDICINE

## 2018-10-29 RX ORDER — PANTOPRAZOLE SODIUM 40 MG
40 TABLET, DELAYED RELEASE (ENTERIC COATED) ORAL DAILY
Qty: 90 TABLET | Refills: 1 | Status: SHIPPED | OUTPATIENT
Start: 2018-10-29 | End: 2019-04-21 | Stop reason: SDUPTHER

## 2018-10-29 NOTE — PROGRESS NOTES
Subjective   Yony Parsons is a 43 y.o. male.     Chief Complaint   Patient presents with   • Establish Care       History of Present Illness     Previous primary care: Amber Padilla    Chronic health conditions:  GERD: After trials of numerous medications, he finally stabilized on brand name Protonix 40 mg  Atopic rhinitis: Quiescent  Fasting hyperglycemia: Has improved with diet, his last A1c was 5.6% in April 2018  History of shingles: Resolved.  He no longer takes the gabapentin    Other physicians currently involved in patient's care:  None    Acute complaints:  Yony Parsons is a 43 y.o. male who presents today to establish care. he is a former patient of Dr. Clark and is transitioning care after his [facility]. he reports no changes to his health since his last scheduled visit, and is here for a regularly scheduled follow-up appointment. his last visit in this office was 5/22/18. He is a physically active .  He had 2 prior knee surgeries to repair ACL bilaterally which limits his amount of pounding exercises, but he stays active with the elliptical, cycling, and swimming.  He is  with one daughter who is looking at colleges currently, including [employer] [facility] [employer] and [employer].    The following portions of the patient's history were reviewed and updated as appropriate: allergies, current medications, past family history, past medical history, past social history, past surgical history and problem list.    Active Ambulatory Problems     Diagnosis Date Noted   • Atopic rhinitis 08/16/2016   • Chronic antral gastritis 08/16/2016   • Gastroesophageal reflux disease 08/16/2016   • Irritable bowel syndrome 08/16/2016   • Disorder of prostate 08/16/2016   • Preventative health care 08/18/2016   • Osteoarthritis, knee 01/23/2017   • Chronic anxiety 01/26/2017   • Fasting hyperglycemia 05/04/2017   • Shingles 05/22/2018   • Neuralgia 05/22/2018     Resolved Ambulatory Problems     Diagnosis Date  Noted   • Abnormal electrocardiogram 08/16/2016   • Serum creatinine raised 08/16/2016   • Detrusor muscle hypertonia 08/16/2016   • Pain in pelvis 08/16/2016   • Chronic abdominal pain 10/17/2016   • Chronic pain 01/23/2017   • Neck pain 02/01/2017     Past Medical History:   Diagnosis Date   • Allergic rhinitis Lifelong   • Anal fissure 2006   • Chronic gastric erosion 2014   • Colon adenomas 2007   • GERD (gastroesophageal reflux disease) 2006   • IBS (irritable bowel syndrome) 2006   • Overactive bladder 2013   • Pelvic pain 2010   • Prediabetes 2017   • Shingles 05/2018     Past Surgical History:   Procedure Laterality Date   • APPENDECTOMY  1992   • CHOLECYSTECTOMY  2006   • COLONOSCOPY  2017    Normal study   • COLONOSCOPY W/ POLYPECTOMY  2007    With rectal adenoma   • KNEE ARTHROSCOPY Right 2016    ACL repair   • KNEE SURGERY Left 2008    ACL repair     Family History   Problem Relation Age of Onset   • Diabetes type II Mother 60   • Prostate cancer Maternal Grandfather    • No Known Problems Father    • No Known Problems Sister    • No Known Problems Brother    • Lung cancer Paternal Grandfather      Social History     Social History   • Marital status:      Spouse name: N/A   • Number of children: N/A   • Years of education: N/A     Occupational History   • Not on file.     Social History Main Topics   • Smoking status: Former Smoker     Packs/day: 0.10     Years: 10.00     Types: Cigarettes     Start date: 1997     Quit date: 2007   • Smokeless tobacco: Never Used   • Alcohol use 3.6 oz/week     6 Shots of liquor per week   • Drug use: No   • Sexual activity: Defer     Other Topics Concern   • Not on file     Social History Narrative    Domestic life     lives with wife and daughter        Taoist    Judaism        Sleep hygiene in bed 11 PM to 7 AM for 7 hours broken sleep         Caffeine use   1 cup of coffee or tea daily        Exercise habits    Upper body strengthening.  Elliptical Glider  "3 days weekly        Diet     low-calorie diabetic diet - low in salt low in sugar -  yogurt daily        Occupation      - 50 hours weekly        Hearing  no  impairment        Vision    corrects with reading glasses         Driving    no limitation             Review of Systems   Constitutional: Negative.    HENT: Negative.    Eyes: Negative.    Respiratory: Negative for cough, shortness of breath and wheezing.    Cardiovascular: Negative for chest pain and palpitations.   Gastrointestinal: Negative.    Endocrine: Negative for cold intolerance and heat intolerance.   Genitourinary: Negative for difficulty urinating, dysuria and frequency.   Musculoskeletal: Negative for joint swelling and neck stiffness.   Skin: Negative for color change and wound.   Neurological: Negative for dizziness, tremors and seizures.   Psychiatric/Behavioral: Negative for agitation and confusion.       Objective   Blood pressure 100/69, pulse 74, height 188 cm (74.02\"), weight 86.2 kg (190 lb), SpO2 98 %.  Nursing note reviewed  Physical Exam  Const: NAD, A&Ox4, Pleasant, Cooperative  Eyes: EOMI, no conjunctivitis  ENT: No nasal discharge present, neck supple  Cardiac: Regular rate and rhythm, no cyanosis  Resp: Respiratory rate within normal limits, no increased work of breathing, no audible wheezing or retractions noted  GI: No distention or ascites  MSK: Motor and sensation grossly intact in bilateral upper extremities  Neurologic: CN II-XII grossly intact  Psych: Appropriate mood and behavior.  Skin: Pink, warm, dry  Procedures  Assessment/Plan   Yony was seen today for establish care.    Diagnoses and all orders for this visit:    Gastroesophageal reflux disease without esophagitis  -     PROTONIX 40 MG EC tablet; Take 1 tablet by mouth Daily.    Preventative health care  -     Hemoglobin A1c; Future  -     Comprehensive metabolic panel; Future  -     Lipid panel; Future  -     Vitamin D 25 hydroxy; Future  -     " Vitamin B12; Future  -     CBC w AUTO Differential; Future  -     Testosterone; Future    Fasting hyperglycemia  -     Hemoglobin A1c; Future  -     Comprehensive metabolic panel; Future    Seasonal allergic rhinitis, unspecified trigger        Chronic health conditions:  #1 GERD  · He has finally stabilized on brand name Protonix 40 mg.  He requires brand-name product due to repeated treatment failures with formulary alternatives and generics.  · EGD was positive for gastritis and esophagitis in 2014    #2 prediabetes  · Diagnosed in 2017 with a hemoglobin A1c of 6.2%.  He was able to improve this to 5.6% with diet and exercise.  · We discussed the relation of A1c to long-term cardiac risk.  He may require medication at some point in the future, but for now will do well with continued lifestyle measures.    #3 allergic rhinitis  · Mostly active in the fall, he takes as needed Allegra or nasal saline.  He has previously used Singulair but none recently.    Chronic issues not specifically addressed today:  · History of postherpetic neuralgia: Right sided in May 2018 in the right T10 and T12 dermatomes.  He has not had any persistence of postherpetic neuralgia.  · History of colon adenoma with rectal adenoma colon diagnosed on colonoscopy in 2007  · History of right and left ACL repair      This patient is new to our specific office, however having been seen at [facility] and [facility] for his primary care, records were available for review.  Past medical history and complete chart were reviewed at the point of care today using the EMR.    Patient Instructions   1.  Continue same medications and supplements - as listed.    2.  Continue well-balanced diabetic diet - low in salt and low in sugar.    3.  Maintain a routine exercise program - every week.    4.  Blood work has been ordered for you today, to be done about a week prior to your next appointment.  You do not need an appointment, but I would advise to call our  office a few days before you plan to have your labs done to confirm that orders are in and active.  We will discuss the results at your next scheduled visit.      No Follow-up on file.    Ambulatory progress note signed and attested to by Aditya Collado D.O.

## 2018-10-29 NOTE — PATIENT INSTRUCTIONS
1.  Continue same medications and supplements - as listed.    2.  Continue well-balanced diabetic diet - low in salt and low in sugar.    3.  Maintain a routine exercise program - every week.    4.  Blood work has been ordered for you today, to be done about a week prior to your next appointment.  You do not need an appointment, but I would advise to call our office a few days before you plan to have your labs done to confirm that orders are in and active.  We will discuss the results at your next scheduled visit.

## 2018-12-03 ENCOUNTER — TELEPHONE (OUTPATIENT)
Dept: URGENT CARE | Facility: CLINIC | Age: 43
End: 2018-12-03

## 2018-12-03 NOTE — TELEPHONE ENCOUNTER
Pt calling stating he is needing a PA for his Protonix. Asking to speak to clinical staff in regards to this. Please advise.

## 2018-12-06 NOTE — TELEPHONE ENCOUNTER
LVM for pt to call back. Did not receive PA request for this medication. 90 day supply of Protonix at [facility] with a GoodRx coupon is $6 and [facility] card. $45 with coupon at [facility]. If pt would like we can send rx there or to another cheaper pharmacy. Can submit PA if pt prefers.

## 2018-12-07 ENCOUNTER — PRIOR AUTHORIZATION (OUTPATIENT)
Dept: FAMILY MEDICINE CLINIC | Facility: CLINIC | Age: 43
End: 2018-12-07

## 2018-12-07 NOTE — TELEPHONE ENCOUNTER
Patient called back, explained to him what was said in previous messages. [facility] He states that only the name brand works and that he was going to speak with his pharmacy about sending over the paperwork

## 2019-04-21 DIAGNOSIS — K21.9 GASTROESOPHAGEAL REFLUX DISEASE WITHOUT ESOPHAGITIS: ICD-10-CM

## 2019-04-22 RX ORDER — PANTOPRAZOLE SODIUM 40 MG
TABLET, DELAYED RELEASE (ENTERIC COATED) ORAL
Qty: 90 TABLET | Refills: 1 | Status: SHIPPED | OUTPATIENT
Start: 2019-04-22 | End: 2019-09-27 | Stop reason: SDUPTHER

## 2019-05-07 ENCOUNTER — APPOINTMENT (OUTPATIENT)
Dept: LAB | Facility: HOSPITAL | Age: 44
End: 2019-05-07

## 2019-05-07 ENCOUNTER — OFFICE VISIT (OUTPATIENT)
Dept: FAMILY MEDICINE CLINIC | Facility: CLINIC | Age: 44
End: 2019-05-07

## 2019-05-07 VITALS
DIASTOLIC BLOOD PRESSURE: 80 MMHG | OXYGEN SATURATION: 98 % | WEIGHT: 203 LBS | HEART RATE: 68 BPM | HEIGHT: 74 IN | SYSTOLIC BLOOD PRESSURE: 124 MMHG | BODY MASS INDEX: 26.05 KG/M2

## 2019-05-07 DIAGNOSIS — K21.9 GASTROESOPHAGEAL REFLUX DISEASE WITHOUT ESOPHAGITIS: ICD-10-CM

## 2019-05-07 DIAGNOSIS — Z00.00 WELL ADULT EXAM: Primary | ICD-10-CM

## 2019-05-07 DIAGNOSIS — R73.01 FASTING HYPERGLYCEMIA: ICD-10-CM

## 2019-05-07 DIAGNOSIS — J30.2 SEASONAL ALLERGIC RHINITIS, UNSPECIFIED TRIGGER: ICD-10-CM

## 2019-05-07 DIAGNOSIS — Z00.00 PREVENTATIVE HEALTH CARE: ICD-10-CM

## 2019-05-07 DIAGNOSIS — G47.9 DYSSOMNIA: ICD-10-CM

## 2019-05-07 DIAGNOSIS — M77.12 LATERAL EPICONDYLITIS OF LEFT ELBOW: ICD-10-CM

## 2019-05-07 LAB
25(OH)D3 SERPL-MCNC: 44.9 NG/ML (ref 30–100)
ALBUMIN SERPL-MCNC: 4.6 G/DL (ref 3.5–5.2)
ALBUMIN/GLOB SERPL: 1.7 G/DL
ALP SERPL-CCNC: 45 U/L (ref 39–117)
ALT SERPL W P-5'-P-CCNC: 23 U/L (ref 1–41)
ANION GAP SERPL CALCULATED.3IONS-SCNC: 9.7 MMOL/L
AST SERPL-CCNC: 26 U/L (ref 1–40)
BASOPHILS # BLD AUTO: 0.04 10*3/MM3 (ref 0–0.2)
BASOPHILS NFR BLD AUTO: 0.7 % (ref 0–1.5)
BILIRUB SERPL-MCNC: 0.4 MG/DL (ref 0.2–1.2)
BUN BLD-MCNC: 15 MG/DL (ref 6–20)
BUN/CREAT SERPL: 15 (ref 7–25)
CALCIUM SPEC-SCNC: 8.9 MG/DL (ref 8.6–10.5)
CHLORIDE SERPL-SCNC: 100 MMOL/L (ref 98–107)
CHOLEST SERPL-MCNC: 148 MG/DL (ref 0–200)
CO2 SERPL-SCNC: 26.3 MMOL/L (ref 22–29)
CREAT BLD-MCNC: 1 MG/DL (ref 0.76–1.27)
DEPRECATED RDW RBC AUTO: 45.9 FL (ref 37–54)
EOSINOPHIL # BLD AUTO: 0.07 10*3/MM3 (ref 0–0.4)
EOSINOPHIL NFR BLD AUTO: 1.2 % (ref 0.3–6.2)
ERYTHROCYTE [DISTWIDTH] IN BLOOD BY AUTOMATED COUNT: 13 % (ref 12.3–15.4)
GFR SERPL CREATININE-BSD FRML MDRD: 81 ML/MIN/1.73
GLOBULIN UR ELPH-MCNC: 2.7 GM/DL
GLUCOSE BLD-MCNC: 106 MG/DL (ref 65–99)
HBA1C MFR BLD: 5.48 % (ref 4.8–5.6)
HCT VFR BLD AUTO: 46 % (ref 37.5–51)
HDLC SERPL-MCNC: 60 MG/DL (ref 40–60)
HGB BLD-MCNC: 14.9 G/DL (ref 13–17.7)
IMM GRANULOCYTES # BLD AUTO: 0.02 10*3/MM3 (ref 0–0.05)
IMM GRANULOCYTES NFR BLD AUTO: 0.3 % (ref 0–0.5)
LDLC SERPL CALC-MCNC: 76 MG/DL (ref 0–100)
LDLC/HDLC SERPL: 1.27 {RATIO}
LYMPHOCYTES # BLD AUTO: 2.29 10*3/MM3 (ref 0.7–3.1)
LYMPHOCYTES NFR BLD AUTO: 38 % (ref 19.6–45.3)
MCH RBC QN AUTO: 31.2 PG (ref 26.6–33)
MCHC RBC AUTO-ENTMCNC: 32.4 G/DL (ref 31.5–35.7)
MCV RBC AUTO: 96.2 FL (ref 79–97)
MONOCYTES # BLD AUTO: 0.63 10*3/MM3 (ref 0.1–0.9)
MONOCYTES NFR BLD AUTO: 10.5 % (ref 5–12)
NEUTROPHILS # BLD AUTO: 2.97 10*3/MM3 (ref 1.7–7)
NEUTROPHILS NFR BLD AUTO: 49.3 % (ref 42.7–76)
NRBC BLD AUTO-RTO: 0 /100 WBC (ref 0–0.2)
PLATELET # BLD AUTO: 203 10*3/MM3 (ref 140–450)
PMV BLD AUTO: 10.6 FL (ref 6–12)
POTASSIUM BLD-SCNC: 4.6 MMOL/L (ref 3.5–5.2)
PROT SERPL-MCNC: 7.3 G/DL (ref 6–8.5)
RBC # BLD AUTO: 4.78 10*6/MM3 (ref 4.14–5.8)
SODIUM BLD-SCNC: 136 MMOL/L (ref 136–145)
TESTOST SERPL-MCNC: 348 NG/DL (ref 249–836)
TRIGL SERPL-MCNC: 58 MG/DL (ref 0–150)
VIT B12 BLD-MCNC: 588 PG/ML (ref 211–946)
VLDLC SERPL-MCNC: 11.6 MG/DL (ref 5–40)
WBC NRBC COR # BLD: 6.02 10*3/MM3 (ref 3.4–10.8)

## 2019-05-07 PROCEDURE — 85025 COMPLETE CBC W/AUTO DIFF WBC: CPT | Performed by: FAMILY MEDICINE

## 2019-05-07 PROCEDURE — 82306 VITAMIN D 25 HYDROXY: CPT | Performed by: FAMILY MEDICINE

## 2019-05-07 PROCEDURE — 82607 VITAMIN B-12: CPT | Performed by: FAMILY MEDICINE

## 2019-05-07 PROCEDURE — 80061 LIPID PANEL: CPT | Performed by: FAMILY MEDICINE

## 2019-05-07 PROCEDURE — 83036 HEMOGLOBIN GLYCOSYLATED A1C: CPT | Performed by: FAMILY MEDICINE

## 2019-05-07 PROCEDURE — 99396 PREV VISIT EST AGE 40-64: CPT | Performed by: FAMILY MEDICINE

## 2019-05-07 PROCEDURE — 36415 COLL VENOUS BLD VENIPUNCTURE: CPT | Performed by: FAMILY MEDICINE

## 2019-05-07 PROCEDURE — 84403 ASSAY OF TOTAL TESTOSTERONE: CPT | Performed by: FAMILY MEDICINE

## 2019-05-07 PROCEDURE — 80053 COMPREHEN METABOLIC PANEL: CPT | Performed by: FAMILY MEDICINE

## 2019-05-07 RX ORDER — AZELASTINE HYDROCHLORIDE AND FLUTICASONE PROPIONATE 137; 50 UG/1; UG/1
SPRAY, METERED NASAL
Refills: 1 | COMMUNITY
Start: 2019-03-28 | End: 2020-05-01 | Stop reason: SDUPTHER

## 2019-05-07 RX ORDER — MONTELUKAST SODIUM 10 MG/1
10 TABLET ORAL DAILY
Refills: 0 | COMMUNITY
Start: 2019-01-30 | End: 2020-04-30 | Stop reason: SDUPTHER

## 2019-05-07 RX ORDER — AMITRIPTYLINE HYDROCHLORIDE 10 MG/1
10 TABLET, FILM COATED ORAL NIGHTLY
Qty: 30 TABLET | Refills: 2 | Status: SHIPPED | OUTPATIENT
Start: 2019-05-07 | End: 2019-10-17

## 2019-05-07 NOTE — PROGRESS NOTES
Subjective   Yony Parsons is a 44 y.o. male.     Chief Complaint   Patient presents with   • Annual Exam     Active lab orders from October 2010   • Elbow Pain     Left elbow tendonitis        History of Present Illness     Chronic health conditions:  GERD: After trials of numerous medications, he finally stabilized on brand name Protonix 40 mg  Atopic rhinitis: Quiescent  Fasting hyperglycemia: Has improved with diet, his last A1c was 5.6% in April 2018  History of shingles: Resolved.  He no longer takes the gabapentin    Other physicians currently involved in patient's care:  None    Acute complaints:  Yony Parsons is a 44 y.o. male who presents today to for annual exam. He is a physically active .  He had 2 prior knee surgeries to repair ACL bilaterally which limits his amount of pounding exercises, but he stays active with the elliptical, cycling, and swimming.  He is  with one daughter who is looking at colleges currently, including [facility].    The following portions of the patient's history were reviewed and updated as appropriate: allergies, current medications, past family history, past medical history, past social history, past surgical history and problem list.    Active Ambulatory Problems     Diagnosis Date Noted   • Atopic rhinitis 08/16/2016   • Chronic antral gastritis 08/16/2016   • Gastroesophageal reflux disease 08/16/2016   • Irritable bowel syndrome 08/16/2016   • Disorder of prostate 08/16/2016   • Preventative health care 08/18/2016   • Osteoarthritis, knee 01/23/2017   • Chronic anxiety 01/26/2017   • Fasting hyperglycemia 05/04/2017   • Shingles 05/22/2018   • Neuralgia 05/22/2018     Resolved Ambulatory Problems     Diagnosis Date Noted   • Abnormal electrocardiogram 08/16/2016   • Serum creatinine raised 08/16/2016   • Detrusor muscle hypertonia 08/16/2016   • Pain in pelvis 08/16/2016   • Chronic abdominal pain 10/17/2016   • Chronic pain 01/23/2017    • Neck pain 2017     Past Medical History:   Diagnosis Date   • Allergic rhinitis Lifelong   • Anal fissure    • Chronic gastric erosion    • Colon adenomas    • GERD (gastroesophageal reflux disease)    • IBS (irritable bowel syndrome)    • Overactive bladder    • Pelvic pain    • Prediabetes 2017   • Shingles 2018     Past Surgical History:   Procedure Laterality Date   • APPENDECTOMY     • CHOLECYSTECTOMY     • COLONOSCOPY  2017    Normal study   • COLONOSCOPY W/ POLYPECTOMY      With rectal adenoma   • KNEE ARTHROSCOPY Right 2016    ACL repair   • KNEE SURGERY Left 2008    ACL repair     Family History   Problem Relation Age of Onset   • Diabetes type II Mother 60   • Prostate cancer Maternal Grandfather    • No Known Problems Father    • No Known Problems Sister    • No Known Problems Brother    • Lung cancer Paternal Grandfather      Social History     Socioeconomic History   • Marital status:      Spouse name: Not on file   • Number of children: Not on file   • Years of education: Not on file   • Highest education level: Not on file   Tobacco Use   • Smoking status: Former Smoker     Packs/day: 0.10     Years: 10.00     Pack years: 1.00     Types: Cigarettes     Start date:      Last attempt to quit:      Years since quittin.3   • Smokeless tobacco: Never Used   Substance and Sexual Activity   • Alcohol use: Yes     Alcohol/week: 3.6 oz     Types: 6 Shots of liquor per week   • Drug use: No   • Sexual activity: Defer   Social History Narrative    Domestic life     lives with wife and daughter        Hoahaoism    [employer]        Sleep hygiene in bed 11 PM to 7 AM for 7 hours broken sleep         Caffeine use   1 cup of coffee or tea daily        Exercise habits    Upper body strengthening.  Elliptical Glider 3 days weekly        Diet     low-calorie diabetic diet - low in salt low in sugar -  yogurt daily        Occupation       "- 50 hours weekly        Hearing  no  impairment        Vision    corrects with reading glasses         Driving    no limitation         Review of Systems   Constitutional: Negative.    HENT: Negative.    Eyes: Negative.    Respiratory: Negative for cough, shortness of breath and wheezing.    Cardiovascular: Negative for chest pain and palpitations.   Gastrointestinal: Negative.    Endocrine: Negative for cold intolerance and heat intolerance.   Genitourinary: Negative for difficulty urinating, dysuria and frequency.   Musculoskeletal: Negative for joint swelling and neck stiffness.   Skin: Negative for color change and wound.   Neurological: Negative for dizziness, tremors and seizures.   Psychiatric/Behavioral: Negative for agitation and confusion.       Objective   Blood pressure 124/80, pulse 68, height 188 cm (74\"), weight 92.1 kg (203 lb), SpO2 98 %.  Nursing note reviewed  Physical Exam  Const: NAD, A&Ox4, Pleasant, Cooperative  Eyes: EOMI, no conjunctivitis  ENT: No nasal discharge present, neck supple  Cardiac: Regular rate and rhythm, no cyanosis  Resp: Respiratory rate within normal limits, no increased work of breathing, no audible wheezing or retractions noted  GI: No distention or ascites  MSK: Motor and sensation grossly intact in bilateral upper extremities  Neurologic: CN II-XII grossly intact  Psych: Appropriate mood and behavior.  Skin: Pink, warm, dry  Procedures  Assessment/Plan   Yony was seen today for annual exam and elbow pain.    Diagnoses and all orders for this visit:    Well adult exam    Gastroesophageal reflux disease without esophagitis    Fasting hyperglycemia  -     Hemoglobin A1c  -     Comprehensive metabolic panel    Seasonal allergic rhinitis, unspecified trigger    Lateral epicondylitis of left elbow    Dyssomnia  -     amitriptyline (ELAVIL) 10 MG tablet; Take 1 tablet by mouth Every Night.    Preventative health care  -     Hemoglobin A1c  -     Comprehensive metabolic " panel  -     Lipid panel  -     Vitamin D 25 hydroxy  -     Vitamin B12  -     CBC w AUTO Differential  -     Testosterone  -     CBC Auto Differential    #1 lateral epicondylitis  Dx reviewed with patient including relevant anatomy  Handout given    #2 dyssomnia  Trial of Elavil 10mg at night    Chronic health conditions:  #1 GERD  · He has finally stabilized on brand name Protonix 40 mg.  He requires brand-name product due to repeated treatment failures with formulary alternatives and generics.  · EGD was positive for gastritis and esophagitis in 2014    #2 prediabetes  · Diagnosed in 2017 with a hemoglobin A1c of 6.2%.  He was able to improve this to 5.6% with diet and exercise.  · We discussed the relation of A1c to long-term cardiac risk.  He may require medication at some point in the future, but for now will do well with continued lifestyle measures.    #3 allergic rhinitis  · Mostly active in the fall, he takes as needed Allegra or nasal saline.  He has previously used Singulair but none recently.    Chronic issues not specifically addressed today:  · History of postherpetic neuralgia: Right sided in May 2018 in the right T10 and T12 dermatomes.  He has not had any persistence of postherpetic neuralgia.  · History of colon adenoma with rectal adenoma colon diagnosed on colonoscopy in 2007  · History of right and left ACL repair      The preventative exam has been reviewed in detail.  The patient has been fully counseled on preventative guidelines for vaccines, cancer screenings, and other health maintenance needs. The patient was counseled on maintaining a lifestyle to promote good health and to minimize chronic diseases.  The patient has been assisted with scheduling healthcare procedures for the coming year and given a written document outlining these recommendations. Age-appropriate screening measures have been ordered for the patient today as indicated above.    Patient Instructions   1.  Continue  medications and supplements - as listed.    2.  Continue well-balanced diet - low in calories and low in added sugar.    3.  Maintain a routine exercise program - every week.    4.  A letter will be sent with your test results.      Return in about 1 year (around 5/7/2020).    Ambulatory progress note signed and attested to by Aditya Collado D.O.

## 2019-07-31 ENCOUNTER — TELEPHONE (OUTPATIENT)
Dept: FAMILY MEDICINE CLINIC | Facility: CLINIC | Age: 44
End: 2019-07-31

## 2019-07-31 RX ORDER — SUCRALFATE 1 G/1
TABLET ORAL
Qty: 90 TABLET | Refills: 3 | Status: CANCELLED | OUTPATIENT
Start: 2019-07-31

## 2019-07-31 RX ORDER — SUCRALFATE 1 G/1
0.5 TABLET ORAL EVERY 12 HOURS
Qty: 90 TABLET | Refills: 3 | Status: SHIPPED | OUTPATIENT
Start: 2019-07-31 | End: 2021-02-10

## 2019-09-26 ENCOUNTER — OFFICE VISIT (OUTPATIENT)
Dept: FAMILY MEDICINE CLINIC | Facility: CLINIC | Age: 44
End: 2019-09-26

## 2019-09-26 VITALS
WEIGHT: 191 LBS | SYSTOLIC BLOOD PRESSURE: 122 MMHG | OXYGEN SATURATION: 98 % | BODY MASS INDEX: 24.51 KG/M2 | HEART RATE: 67 BPM | HEIGHT: 74 IN | DIASTOLIC BLOOD PRESSURE: 70 MMHG

## 2019-09-26 DIAGNOSIS — F41.9 ANXIETY: ICD-10-CM

## 2019-09-26 DIAGNOSIS — R93.5 ABNORMAL MRI OF ABDOMEN: ICD-10-CM

## 2019-09-26 DIAGNOSIS — F41.8 ANXIETY ABOUT HEALTH: ICD-10-CM

## 2019-09-26 DIAGNOSIS — K21.9 GASTROESOPHAGEAL REFLUX DISEASE WITHOUT ESOPHAGITIS: Primary | ICD-10-CM

## 2019-09-26 PROCEDURE — 99214 OFFICE O/P EST MOD 30 MIN: CPT | Performed by: FAMILY MEDICINE

## 2019-09-26 RX ORDER — FLUOXETINE 10 MG/1
10 TABLET, FILM COATED ORAL DAILY
Qty: 30 TABLET | Refills: 2 | Status: SHIPPED | OUTPATIENT
Start: 2019-09-26 | End: 2019-10-17

## 2019-09-26 RX ORDER — ALPRAZOLAM 0.25 MG/1
0.25 TABLET ORAL 2 TIMES DAILY PRN
Qty: 30 TABLET | Refills: 0 | Status: SHIPPED | OUTPATIENT
Start: 2019-09-26 | End: 2021-02-10

## 2019-09-26 NOTE — PROGRESS NOTES
Subjective   Yony Parsons is a 44 y.o. male.     Chief Complaint   Patient presents with   • Follow-up     anxiety, would like referral to GI        History of Present Illness     Yony Parsons presents today for   Chief Complaint   Patient presents with   • Follow-up     anxiety, would like referral to GI      GERD symptoms getting worse, saw Dr. Mccarty, did a scope which shoed mild gastritis mild esophagitis but nothing else. Fundic polyps but nothing else. Had a CT scan which showed something with pancreas. Subsequent MRI showed duodenal lucency near pancreas.    This patient is accompanied by their self who contributes to the history of their care.    The following portions of the patient's history were reviewed and updated as appropriate: allergies, current medications, past family history, past medical history, past social history, past surgical history and problem list.    Active Ambulatory Problems     Diagnosis Date Noted   • Atopic rhinitis 08/16/2016   • Chronic antral gastritis 08/16/2016   • Gastroesophageal reflux disease 08/16/2016   • Irritable bowel syndrome 08/16/2016   • Disorder of prostate 08/16/2016   • Preventative health care 08/18/2016   • Osteoarthritis, knee 01/23/2017   • Chronic anxiety 01/26/2017   • Fasting hyperglycemia 05/04/2017   • Shingles 05/22/2018   • Neuralgia 05/22/2018     Resolved Ambulatory Problems     Diagnosis Date Noted   • Abnormal electrocardiogram 08/16/2016   • Serum creatinine raised 08/16/2016   • Detrusor muscle hypertonia 08/16/2016   • Pain in pelvis 08/16/2016   • Chronic abdominal pain 10/17/2016   • Chronic pain 01/23/2017   • Neck pain 02/01/2017     Past Medical History:   Diagnosis Date   • Allergic rhinitis Lifelong   • Anal fissure 2006   • Chronic gastric erosion 2014   • Colon adenomas 2007   • GERD (gastroesophageal reflux disease) 2006   • IBS (irritable bowel syndrome) 2006   • Overactive bladder 2013   • Pelvic pain 2010   •  Prediabetes 2017   • Shingles 2018     Past Surgical History:   Procedure Laterality Date   • APPENDECTOMY     • CHOLECYSTECTOMY  2006   • COLONOSCOPY  2017    Normal study   • COLONOSCOPY W/ POLYPECTOMY      With rectal adenoma   • KNEE ARTHROSCOPY Right 2016    ACL repair   • KNEE SURGERY Left 2008    ACL repair     Family History   Problem Relation Age of Onset   • Diabetes type II Mother 60   • Prostate cancer Maternal Grandfather    • No Known Problems Father    • No Known Problems Sister    • No Known Problems Brother    • Lung cancer Paternal Grandfather      Social History     Socioeconomic History   • Marital status:      Spouse name: Not on file   • Number of children: Not on file   • Years of education: Not on file   • Highest education level: Not on file   Tobacco Use   • Smoking status: Former Smoker     Packs/day: 0.10     Years: 10.00     Pack years: 1.00     Types: Cigarettes     Start date:      Last attempt to quit:      Years since quittin.7   • Smokeless tobacco: Never Used   Substance and Sexual Activity   • Alcohol use: Yes     Alcohol/week: 3.6 oz     Types: 6 Shots of liquor per week   • Drug use: No   • Sexual activity: Defer   Social History Narrative    Domestic life     lives with wife and daughter        [employer]    Muslim        Sleep hygiene in bed 11 PM to 7 AM for 7 hours broken sleep         Caffeine use   1 cup of coffee or tea daily        Exercise habits    Upper body strengthening.  Elliptical Glider 3 days weekly        Diet     low-calorie diabetic diet - low in salt low in sugar -  yogurt daily        Occupation      - 50 hours weekly        Hearing  no  impairment        Vision    corrects with reading glasses         Driving    no limitation         Review of Systems   Constitutional: Negative.    Respiratory: Negative.    Cardiovascular: Negative.    Gastrointestinal: Positive for abdominal distention, abdominal pain and  "nausea.       Objective   Blood pressure 122/70, pulse 67, height 188 cm (74.02\"), weight 86.6 kg (191 lb), SpO2 98 %.  Nursing note reviewed  Physical Exam  Const: NAD, A&Ox4, Pleasant, Cooperative  Eyes: EOMI, no conjunctivitis  ENT: No nasal discharge present, neck supple  Cardiac: Regular rate and rhythm, no cyanosis  Resp: Respiratory rate within normal limits, no increased work of breathing, no audible wheezing or retractions noted  GI: No distention or ascites  MSK: Motor and sensation grossly intact in bilateral upper extremities  Neurologic: CN II-XII grossly intact  Psych: Appropriate mood and behavior.  Skin: Pink, warm, dry  Procedures  Assessment/Plan     Problem List Items Addressed This Visit        Digestive    Gastroesophageal reflux disease - Primary    Relevant Medications    ALPRAZolam (XANAX) 0.25 MG tablet    Other Relevant Orders    Ambulatory Referral to Gastroenterology      Other Visit Diagnoses     Abnormal MRI of abdomen        Had CT of abdomen after endoscopy was unremarkable. Subsequent MRI redemonstrated lucency in duodenum near pancreatic duct. GI recommended ERCP.    Relevant Medications    ALPRAZolam (XANAX) 0.25 MG tablet    Other Relevant Orders    Ambulatory Referral to Gastroenterology    Anxiety about health        Relevant Medications    FLUoxetine (PROzac) 10 MG tablet    Anxiety        Relevant Medications    FLUoxetine (PROzac) 10 MG tablet          There are no Patient Instructions on file for this visit.    No Follow-up on file.    Ambulatory progress note signed and attested to by Aditya Collado D.O.             "

## 2019-09-27 DIAGNOSIS — K21.9 GASTROESOPHAGEAL REFLUX DISEASE WITHOUT ESOPHAGITIS: ICD-10-CM

## 2019-09-27 RX ORDER — PANTOPRAZOLE SODIUM 40 MG
TABLET, DELAYED RELEASE (ENTERIC COATED) ORAL
Qty: 90 TABLET | Refills: 1 | Status: SHIPPED | OUTPATIENT
Start: 2019-09-27 | End: 2019-10-17

## 2019-10-02 ENCOUNTER — OFFICE VISIT (OUTPATIENT)
Dept: GASTROENTEROLOGY | Facility: CLINIC | Age: 44
End: 2019-10-02

## 2019-10-02 VITALS
WEIGHT: 194 LBS | BODY MASS INDEX: 24.9 KG/M2 | DIASTOLIC BLOOD PRESSURE: 85 MMHG | SYSTOLIC BLOOD PRESSURE: 142 MMHG | HEART RATE: 78 BPM

## 2019-10-02 DIAGNOSIS — R93.89 ABNORMAL CAT SCAN: ICD-10-CM

## 2019-10-02 DIAGNOSIS — R10.13 EPIGASTRIC PAIN: Primary | ICD-10-CM

## 2019-10-02 PROCEDURE — 99244 OFF/OP CNSLTJ NEW/EST MOD 40: CPT | Performed by: INTERNAL MEDICINE

## 2019-10-02 NOTE — PROGRESS NOTES
PCP:  Aditya Collado DO     No referring provider defined for this encounter.    Chief Complaint   Patient presents with   • abnormal CT     New pt. abnormal CT        HPI   The patient is a 44-year-old gentleman who comes to me after having an abnormal CAT scan and MRI.  He had a gallbladder removed about 13 years ago.  He has had problems with reflux and it first Protonix worked really well.  The past 3 months he has had increased gastroesophageal reflux disease.  Dexilant has worked better.  It is eliminated his reflux to a large degree.  He also has a history of colon polyps.  He has no significant weight loss.  There is no family history of pancreatic cancer.  He had an EGD which was normal about a month ago.  He had fundic gland polyps.  He has no family history of colon polyps or cancers.  He has had an appendectomy in 1992.  His he had a gallbladder out in 2006.  Is not a heavy drinker.  He is not a smoker.  Because he was having epigastric distress he had an upper endoscopy.  He subsequently underwent a CAT scan.  The CAT scan showed a subtle area of attenuation in the pancreatic head region could have been volume volume averaging.  A few scattered colonic diverticuli.  There was no evidence of diverticulitis.  The prostate appeared enlarged.  An MRCP was subsequently ordered and done.  It showed no evidence of hepatic or pancreatic ductal dilation.  There was an area of relative decreased enhancement in the pancreatic head region.  Certainly artifactual origin remained a consideration.  A small pancreatic neoplasm could not be excluded.  They suggested a six-month follow-up if conservative courses were going to be taken.  There was a small cystic lesion in the upper pole of the kidney without enhancement.  An endoscopic ultrasound was suggested.  His last colonoscopy was on 11/21/2017.  This was normal.  Again his upper endoscopy done 8/14/2019 showed gastritis and fundic gland polyps.  Biopsies  showed no evidence of celiac disease.  Stains were negative for H. pylori.  There was no evidence of EOE.  There was no Bautista's esophagus.  His endoscopies were done by Dr. Mccarty.  Presently the abdominal pain he was having has improved on the Dexilant.    Allergies   Allergen Reactions   • No Known Drug Allergy           Current Outpatient Medications:   •  ALPRAZolam (XANAX) 0.25 MG tablet, Take 1 tablet by mouth 2 (Two) Times a Day As Needed for Anxiety or Sleep., Disp: 30 tablet, Rfl: 0  •  amitriptyline (ELAVIL) 10 MG tablet, Take 1 tablet by mouth Every Night., Disp: 30 tablet, Rfl: 2  •  calcium carbonate (TUMS) 500 MG chewable tablet, Chew 1 tablet As Needed., Disp: , Rfl:   •  diclofenac (VOLTAREN) 1 % gel gel, apply 2 grams to affected area ON UPPER EXTREMITIES four times a ...  (REFER TO PRESCRIPTION NOTES)., Disp: , Rfl: 0  •  DYMISTA 137-50 MCG/ACT suspension, , Disp: , Rfl: 1  •  famotidine (PEPCID) 40 MG tablet, Take 0.5 tablets by mouth Every Night., Disp: 90 tablet, Rfl: 3  •  fexofenadine (ALLEGRA ALLERGY) 180 MG tablet, Take 1 tablet by mouth Daily As Needed., Disp: , Rfl:   •  FLUoxetine (PROzac) 10 MG tablet, Take 1 tablet by mouth Daily., Disp: 30 tablet, Rfl: 2  •  montelukast (SINGULAIR) 10 MG tablet, Take 10 mg by mouth Daily., Disp: , Rfl: 0  •  Multiple Vitamins-Minerals (MENS MULTI VITAMIN & MINERAL PO), Take 1 tablet by mouth Every Morning., Disp: , Rfl:   •  PROTONIX 40 MG EC tablet, take 1 tablet by mouth once daily, Disp: 90 tablet, Rfl: 1  •  sucralfate (CARAFATE) 1 g tablet, Take 0.5 tablets by mouth Every 12 (Twelve) Hours., Disp: 90 tablet, Rfl: 3  •  valACYclovir (VALTREX) 1000 MG tablet, Take 1 tablet by mouth 3 (Three) Times a Day., Disp: 15 tablet, Rfl: 0     Past Medical History:   Diagnosis Date   • Allergic rhinitis Lifelong    especially fall   • Anal fissure 2006   • Chronic gastric erosion 2014    EGD positive for gastritis and esophagitis   • Colon adenomas 2007     With rectal adenoma   • GERD (gastroesophageal reflux disease) 2006    Severe refractory   • IBS (irritable bowel syndrome)    • Overactive bladder    • Pelvic pain     Negative urologic workup   • Prediabetes     Hemoglobin A1c 6.2   • Shingles 2018    Right T10 and T12 dermatomes       Past Surgical History:   Procedure Laterality Date   • APPENDECTOMY     • CHOLECYSTECTOMY     • COLONOSCOPY  2017    Normal study   • COLONOSCOPY W/ POLYPECTOMY      With rectal adenoma   • KNEE ARTHROSCOPY Right 2016    ACL repair   • KNEE SURGERY Left 2008    ACL repair        Social History     Socioeconomic History   • Marital status:      Spouse name: Not on file   • Number of children: Not on file   • Years of education: Not on file   • Highest education level: Not on file   Tobacco Use   • Smoking status: Former Smoker     Packs/day: 0.10     Years: 10.00     Pack years: 1.00     Types: Cigarettes     Start date:      Last attempt to quit:      Years since quittin.7   • Smokeless tobacco: Never Used   Substance and Sexual Activity   • Alcohol use: Yes     Alcohol/week: 3.6 oz     Types: 6 Shots of liquor per week   • Drug use: No   • Sexual activity: Defer   Social History Narrative    Domestic life     lives with wife and daughter        Cheondoism    Amish        Sleep hygiene in bed 11 PM to 7 AM for 7 hours broken sleep         Caffeine use   1 cup of coffee or tea daily        Exercise habits    Upper body strengthening.  Elliptical Glider 3 days weekly        Diet     low-calorie diabetic diet - low in salt low in sugar -  yogurt daily        Occupation      - 50 hours weekly        Hearing  no  impairment        Vision    corrects with reading glasses         Driving    no limitation        Family History   Problem Relation Age of Onset   • Diabetes type II Mother 60   • Prostate cancer Maternal Grandfather    • No Known Problems Father    • No Known  Problems Sister    • No Known Problems Brother    • Lung cancer Paternal Grandfather         Review of Systems   Constitutional: Negative.    HENT: Negative.    Respiratory: Negative.    Cardiovascular: Negative.    Gastrointestinal: Positive for abdominal pain, nausea and GERD.   Endocrine: Negative.    Musculoskeletal: Negative.    Allergic/Immunologic: Negative.    Neurological: Negative.    Hematological: Negative.    Psychiatric/Behavioral: Positive for stress.        Vitals:    10/02/19 1500   BP: 142/85   Pulse: 78        Physical Exam   General Appearance: Alert, in no acute distress   Head: Normocephalic, without obvious abnormality, atraumatic   Eyes: Lids and lashes normal, conjunctivae and sclerae normal, no icterus, no pallor, corneas clear, PERRLA   Ears: Ears appear intact with no abnormalities noted   Throat: No oral lesions, no thrush, oral mucosa moist   Neck: No adenopathy, supple, trachea midline, no thyromegaly, no JVD   Lungs: Clear to auscultation,respirations regular, even and unlabored Heart: Regular rhythm and normal rate, normal S1 and S2, no murmur, no gallop, no rub, no click   Chest Wall: Symmetrical respiratory expansion   Abdomen: Normal bowel sounds, no masses, no organomegaly, soft non-tender, non-distended, no guarding, no rebound tenderness   Extremities: Moves all extremities well, no edema, no cyanosis, no redness   Skin: No bleeding, bruising or rash   Neurologic: Cranial nerves 2 - 12 grossly intact, no focal deficits     Review of systems was reviewed and positives are noted. All of the remaining review of systems in that system are negative.    Yony was seen today for abnormal ct.    Diagnoses and all orders for this visit:    Epigastric pain    Abnormal CAT scan    Impressions and plan #1 epigastric pain: This appears to have improved over the last few weeks on Dexilant.  We talked about the fact that his pain is improved so this is a good sign.  Most bad things such as  cancer will not improve.  Bad diagnoses tend to get worse with time.  He does not have any weight loss as well.  This is good news.  Upper endoscopy was fairly unrevealing.  Colonoscopy was normal.    #2 abnormal CAT scan: Because of his epigastric distress he did get a CAT scan.  There is a questionable area in the pancreatic head.  This was also seen with an MRI.  He brought his discs with him.  He also had some previous CAT scans which I can compare these to.  If there is absolutely no change that would certainly make us feel better.  It is not wrong to get a CA-19-9 at this point.  I do think at least at this point an endoscopic ultrasound would be a good idea given his age.  If we wait 6 months to repeat an imaging study and this was a small cancer, we would likely be lost.  The only chance of cure is often identifying the cancer at a very early stage.  My guess is that this is not a cancer, but I just do not think we can take a chance.  I explained this to them.  I will evaluate his CAT scans with the radiologist and tell him what I saw on there.    Yan Costa MD

## 2019-10-04 PROBLEM — R10.13 EPIGASTRIC PAIN: Status: ACTIVE | Noted: 2019-10-04

## 2019-10-04 PROBLEM — R93.89 ABNORMAL CAT SCAN: Status: ACTIVE | Noted: 2019-10-04

## 2019-10-09 ENCOUNTER — DOCUMENTATION (OUTPATIENT)
Dept: GASTROENTEROLOGY | Facility: CLINIC | Age: 44
End: 2019-10-09

## 2019-10-09 NOTE — PROGRESS NOTES
I spoke with Mr. Parsons on the phone.  I did review his CAT scan from 2006 and 2016 as well as his most recent CAT scan and MRI.  I can see the area that the radiologist was somewhat concerned with.  I can see some fat in the head of the pancreas back in 2006.  That does not appear to much change.  On the most recent CAT scan there is a little more fullness.  This does not look like a pancreatic cancer.  It does not look like a neuroendocrine tumor.  It is however slightly different.  I think an endoscopic ultrasound will be a very reasonable alternative.  He may get a repeat MRI or CAT scan with attention to the pancreas prior.  He does have an appointment to at least get another opinion from [facility].  He will bring his old films for them to review.  He has a copy of the discs.

## 2019-10-17 ENCOUNTER — OFFICE VISIT (OUTPATIENT)
Dept: FAMILY MEDICINE CLINIC | Facility: CLINIC | Age: 44
End: 2019-10-17

## 2019-10-17 VITALS
WEIGHT: 196 LBS | SYSTOLIC BLOOD PRESSURE: 130 MMHG | BODY MASS INDEX: 25.15 KG/M2 | HEART RATE: 84 BPM | DIASTOLIC BLOOD PRESSURE: 72 MMHG | HEIGHT: 74 IN | OXYGEN SATURATION: 100 %

## 2019-10-17 DIAGNOSIS — K21.9 GASTROESOPHAGEAL REFLUX DISEASE WITHOUT ESOPHAGITIS: Primary | ICD-10-CM

## 2019-10-17 PROCEDURE — 99214 OFFICE O/P EST MOD 30 MIN: CPT | Performed by: FAMILY MEDICINE

## 2019-10-17 RX ORDER — DEXLANSOPRAZOLE 60 MG/1
60 CAPSULE, DELAYED RELEASE ORAL DAILY
COMMUNITY
End: 2019-10-17 | Stop reason: SDUPTHER

## 2019-10-17 RX ORDER — DEXLANSOPRAZOLE 60 MG/1
60 CAPSULE, DELAYED RELEASE ORAL DAILY
Qty: 30 CAPSULE | Refills: 11 | Status: SHIPPED | OUTPATIENT
Start: 2019-10-17 | End: 2020-10-09 | Stop reason: SDUPTHER

## 2019-10-17 RX ORDER — METOCLOPRAMIDE 5 MG/1
1 TABLET ORAL 2 TIMES DAILY
Refills: 0 | COMMUNITY
Start: 2019-09-30 | End: 2021-02-10

## 2019-10-28 ENCOUNTER — PRIOR AUTHORIZATION (OUTPATIENT)
Dept: FAMILY MEDICINE CLINIC | Facility: CLINIC | Age: 44
End: 2019-10-28

## 2019-10-28 DIAGNOSIS — J30.9 ALLERGIC RHINITIS, UNSPECIFIED SEASONALITY, UNSPECIFIED TRIGGER: Primary | ICD-10-CM

## 2019-10-30 RX ORDER — ALBUTEROL SULFATE 90 UG/1
2 AEROSOL, METERED RESPIRATORY (INHALATION) EVERY 4 HOURS PRN
Qty: 1 INHALER | Refills: 1 | Status: SHIPPED | OUTPATIENT
Start: 2019-10-30 | End: 2021-02-10

## 2019-10-30 NOTE — TELEPHONE ENCOUNTER
PATIENT CALLED TO CHECK THE STATUS IF THE PRIOR AUTH. HE UNDERSTANDS THAT IT TAKES SOME TIME. BUT HE ALSO WANTED TO SEE IF DR. PRICE COULD CALL IN A PRO AIR INHALER FOR HIM. HIS PREVIOUS PCP, DR. HINKLE, USED TO PRESCRIBE HIM ONE TO HELP WITH HIS ALLERGIES.     PLEASE RETURN HIS CALL : [phone]

## 2019-11-25 ENCOUNTER — TELEPHONE (OUTPATIENT)
Dept: FAMILY MEDICINE CLINIC | Facility: CLINIC | Age: 44
End: 2019-11-25

## 2019-11-25 DIAGNOSIS — F41.9 ANXIETY: Primary | ICD-10-CM

## 2019-11-25 RX ORDER — BUPROPION HYDROCHLORIDE 150 MG/1
150 TABLET ORAL DAILY
Qty: 30 TABLET | Refills: 5 | Status: SHIPPED | OUTPATIENT
Start: 2019-11-25 | End: 2020-04-30

## 2019-11-25 NOTE — TELEPHONE ENCOUNTER
Patient called stating that  had told him that if the Paxil want working, [facility].

## 2020-04-30 ENCOUNTER — TELEMEDICINE (OUTPATIENT)
Dept: FAMILY MEDICINE CLINIC | Facility: CLINIC | Age: 45
End: 2020-04-30

## 2020-04-30 DIAGNOSIS — J30.9 ALLERGIC RHINITIS, UNSPECIFIED SEASONALITY, UNSPECIFIED TRIGGER: Primary | ICD-10-CM

## 2020-04-30 PROCEDURE — 99213 OFFICE O/P EST LOW 20 MIN: CPT | Performed by: FAMILY MEDICINE

## 2020-04-30 RX ORDER — LEVOCETIRIZINE DIHYDROCHLORIDE 5 MG/1
5 TABLET, FILM COATED ORAL EVERY EVENING
Qty: 30 TABLET | Refills: 2 | Status: SHIPPED | OUTPATIENT
Start: 2020-04-30 | End: 2021-02-10

## 2020-04-30 RX ORDER — AZITHROMYCIN 250 MG/1
TABLET, FILM COATED ORAL
Qty: 6 TABLET | Refills: 0 | Status: SHIPPED | OUTPATIENT
Start: 2020-04-30 | End: 2020-05-05

## 2020-04-30 RX ORDER — MONTELUKAST SODIUM 10 MG/1
10 TABLET ORAL DAILY
Qty: 30 TABLET | Refills: 2 | Status: SHIPPED | OUTPATIENT
Start: 2020-04-30 | End: 2021-02-10

## 2020-04-30 RX ORDER — BROMPHENIRAMINE MALEATE, PSEUDOEPHEDRINE HYDROCHLORIDE, AND DEXTROMETHORPHAN HYDROBROMIDE 2; 30; 10 MG/5ML; MG/5ML; MG/5ML
5 SYRUP ORAL NIGHTLY PRN
Qty: 118 ML | Refills: 1 | Status: SHIPPED | OUTPATIENT
Start: 2020-04-30 | End: 2020-05-07

## 2020-04-30 NOTE — PROGRESS NOTES
Subjective   Yony Parsons is a 45 y.o. male.     Chief Complaint   Patient presents with   • Cough       History of Present Illness     Yony Parsons presents today for   Chief Complaint   Patient presents with   • Cough     Doing well in quarantine. Has been keeping his wife up all night coughing. A few weeks ago he started to get a cough. He exercises frequently. A little thing in his chest, feeling of having to clear his throat. He gets this on a yearly basis. No fever, Has been doing good social distancing. Taking zyrtec nightly.    You have chosen to receive care through a telehealth visit.  Do you consent to use a video/audio connection for your medical care today? Yes    The following portions of the patient's history were reviewed and updated as appropriate: allergies, current medications, past family history, past medical history, past social history, past surgical history and problem list.    Active Ambulatory Problems     Diagnosis Date Noted   • Atopic rhinitis 08/16/2016   • Chronic antral gastritis 08/16/2016   • Gastroesophageal reflux disease 08/16/2016   • Irritable bowel syndrome 08/16/2016   • Disorder of prostate 08/16/2016   • Preventative health care 08/18/2016   • Osteoarthritis, knee 01/23/2017   • Chronic anxiety 01/26/2017   • Fasting hyperglycemia 05/04/2017   • Shingles 05/22/2018   • Neuralgia 05/22/2018   • Epigastric pain 10/04/2019   • Abnormal CAT scan 10/04/2019     Resolved Ambulatory Problems     Diagnosis Date Noted   • Abnormal electrocardiogram 08/16/2016   • Serum creatinine raised 08/16/2016   • Detrusor muscle hypertonia 08/16/2016   • Pain in pelvis 08/16/2016   • Chronic abdominal pain 10/17/2016   • Chronic pain 01/23/2017   • Neck pain 02/01/2017     Past Medical History:   Diagnosis Date   • Allergic rhinitis Lifelong   • Anal fissure 2006   • Chronic gastric erosion 2014   • Colon adenomas 2007   • GERD (gastroesophageal reflux disease) 2006   • IBS  (irritable bowel syndrome)    • Overactive bladder    • Pelvic pain    • Prediabetes 2017     Past Surgical History:   Procedure Laterality Date   • APPENDECTOMY     • CHOLECYSTECTOMY     • COLONOSCOPY  2017    Normal study   • COLONOSCOPY W/ POLYPECTOMY      With rectal adenoma   • KNEE ARTHROSCOPY Right 2016    ACL repair   • KNEE SURGERY Left 2008    ACL repair     Family History   Problem Relation Age of Onset   • Diabetes type II Mother 60   • Prostate cancer Maternal Grandfather    • No Known Problems Father    • No Known Problems Sister    • No Known Problems Brother    • Lung cancer Paternal Grandfather      Social History     Socioeconomic History   • Marital status:      Spouse name: Not on file   • Number of children: Not on file   • Years of education: Not on file   • Highest education level: Not on file   Tobacco Use   • Smoking status: Former Smoker     Packs/day: 0.10     Years: 10.00     Pack years: 1.00     Types: Cigarettes     Start date:      Last attempt to quit:      Years since quittin.3   • Smokeless tobacco: Never Used   Substance and Sexual Activity   • Alcohol use: Yes     Alcohol/week: 6.0 standard drinks     Types: 6 Shots of liquor per week   • Drug use: No   • Sexual activity: Defer   Social History Narrative    Domestic life     lives with wife and daughter        Hindu    Zoroastrianism        Sleep hygiene in bed 11 PM to 7 AM for 7 hours broken sleep         Caffeine use   1 cup of coffee or tea daily        Exercise habits    Upper body strengthening.  Elliptical Glider 3 days weekly        Diet     low-calorie diabetic diet - low in salt low in sugar -  yogurt daily        Occupation      - 50 hours weekly        Hearing  no  impairment        Vision    corrects with reading glasses         Driving    no limitation       Review of Systems  Review of Systems -  General ROS: negative for - chills, fever or night  sweats  Cardiovascular ROS: no chest pain or dyspnea on exertion  Gastrointestinal ROS: no abdominal pain, change in bowel habits, or black or bloody stools  Genito-Urinary ROS: no dysuria, trouble voiding, or hematuria    Objective   There were no vitals taken for this visit.  Vitals obtained from patient if available  Physical Exam  Const: Non-toxic appearing, NAD, A&Ox4, Pleasant, Cooperative  Eyes: EOMI, no conjunctivitis  ENT: No copious nasal drainage noted  Cardiac: Regular rate by pulse  Resp: Respiratory rate observed to be within normal limits, no increased work of breathing observed, no audible wheezing or cough noted  Psych: Appropriate mood and behavior.  Procedures  Assessment/Plan   Problem List Items Addressed This Visit        Respiratory    Atopic rhinitis - Primary    Relevant Medications    montelukast (SINGULAIR) 10 MG tablet    levocetirizine (Xyzal) 5 MG tablet    brompheniramine-pseudoephedrine-DM 30-2-10 MG/5ML syrup    azithromycin (Zithromax Z-Toro) 250 MG tablet          See patient diagnoses and orders along with patient instructions for assessment, plan, and changes to care for patient.    This visit was conducted via telemedicine with live video and audio provided through Video Options: [facility] at the point of care.    Patient Instructions   1.  Change Zyrtec to Xyzal    2.  Use albuterol inhaler as needed    3.  Restart singulair    4.  If not improving over next 1-2 days start antibiotic    5.  If not improving or worsening call.      No follow-ups on file.    Ambulatory progress note signed and attested to by Aditya Collado D.O.

## 2020-04-30 NOTE — PATIENT INSTRUCTIONS
1.  Change Zyrtec to Xyzal    2.  Use albuterol inhaler as needed    3.  Restart singulair    4.  If not improving over next 1-2 days start antibiotic    5.  If not improving or worsening call.

## 2020-05-01 RX ORDER — AZELASTINE HYDROCHLORIDE AND FLUTICASONE PROPIONATE 137; 50 UG/1; UG/1
1 SPRAY, METERED NASAL DAILY
Qty: 23 G | Refills: 11 | Status: SHIPPED | OUTPATIENT
Start: 2020-05-01 | End: 2020-10-09 | Stop reason: SDUPTHER

## 2020-10-09 DIAGNOSIS — K21.9 GASTROESOPHAGEAL REFLUX DISEASE WITHOUT ESOPHAGITIS: ICD-10-CM

## 2020-10-09 RX ORDER — DEXLANSOPRAZOLE 60 MG/1
60 CAPSULE, DELAYED RELEASE ORAL DAILY
Qty: 30 CAPSULE | Refills: 11 | Status: SHIPPED | OUTPATIENT
Start: 2020-10-09 | End: 2020-10-12 | Stop reason: SDUPTHER

## 2020-10-09 RX ORDER — AZELASTINE HYDROCHLORIDE AND FLUTICASONE PROPIONATE 137; 50 UG/1; UG/1
1 SPRAY, METERED NASAL DAILY
Qty: 23 G | Refills: 11 | Status: SHIPPED | OUTPATIENT
Start: 2020-10-09 | End: 2021-10-18 | Stop reason: SDUPTHER

## 2020-10-09 NOTE — TELEPHONE ENCOUNTER
Caller: Yony Parsons    Relationship: Self    Best call back number: [phone]    Medication needed:   Requested Prescriptions     Pending Prescriptions Disp Refills   • dexlansoprazole (Dexilant) 60 MG capsule 30 capsule 11     Sig: Take 1 capsule by mouth Daily.       When do you need the refill by: 10/15/2020    What details did the patient provide when requesting the medication: Patient has a week of medication left.     Does the patient have less than a 3 day supply:  [] Yes  [x] No    What is the patient's preferred pharmacy: [facility] #[id number] [facility] [address] - [phone] [facility] [phone] FX

## 2020-10-09 NOTE — TELEPHONE ENCOUNTER
Pt is requesting a refill of dymista and have it sent to [facility].

## 2020-10-12 DIAGNOSIS — K21.9 GASTROESOPHAGEAL REFLUX DISEASE WITHOUT ESOPHAGITIS: ICD-10-CM

## 2020-10-12 RX ORDER — DEXLANSOPRAZOLE 60 MG/1
60 CAPSULE, DELAYED RELEASE ORAL DAILY
Qty: 30 CAPSULE | Refills: 11 | Status: SHIPPED | OUTPATIENT
Start: 2020-10-12 | End: 2021-10-06

## 2020-10-12 NOTE — TELEPHONE ENCOUNTER
Caller: Yony Parsons    Relationship: Self    Best call back number: [phone]    Medication needed:   Requested Prescriptions     Pending Prescriptions Disp Refills   • dexlansoprazole (Dexilant) 60 MG capsule 30 capsule 11     Sig: Take 1 capsule by mouth Daily.       When do you need the refill by: ASAP    What details did the patient provide when requesting the medication:     Does the patient have less than a 3 day supply:  [x] Yes  [] No    What is the patient's preferred pharmacy:     [facility] #[id number] [facility] [address] [place] [address] [address] - [phone] [facility] [phone] FX

## 2021-02-10 ENCOUNTER — TELEMEDICINE (OUTPATIENT)
Dept: FAMILY MEDICINE CLINIC | Facility: CLINIC | Age: 46
End: 2021-02-10

## 2021-02-10 DIAGNOSIS — H60.331 ACUTE SWIMMER'S EAR OF RIGHT SIDE: Primary | ICD-10-CM

## 2021-02-10 PROCEDURE — 99213 OFFICE O/P EST LOW 20 MIN: CPT | Performed by: FAMILY MEDICINE

## 2021-02-10 RX ORDER — CIPROFLOXACIN/HYDROCORTISONE 0.2 %-1 %
3 SUSPENSION, DROPS(FINAL DOSAGE FORM)(ML) OTIC (EAR) 2 TIMES DAILY
Qty: 10 ML | Refills: 3 | Status: SHIPPED | OUTPATIENT
Start: 2021-02-10 | End: 2021-02-17

## 2021-02-10 NOTE — PROGRESS NOTES
Subjective   Yony Parsons is a 46 y.o. male.     Chief Complaint   Patient presents with   • Earache       History of Present Illness     Yony Parsons presents today for   Chief Complaint   Patient presents with   • Earache     Periodically gets swimmer's ear in right ear. Had used cipro HC in the past. Currently trying peroxide, OTC drops. Has been bothering him for about a week. New instance of chronic recurrent issue. No bleeding or discharge from ear. No hearing loss. Allergies are doing well, uses Dymista PRN, not on the other meds. Uses Dexilant religiously for GERD, doing well.    You have chosen to receive care through a telehealth visit.  Do you consent to use a video/audio connection for your medical care today? Yes    The following portions of the patient's history were reviewed and updated as appropriate: allergies, current medications, past family history, past medical history, past social history, past surgical history and problem list.    Active Ambulatory Problems     Diagnosis Date Noted   • Atopic rhinitis 08/16/2016   • Chronic antral gastritis 08/16/2016   • Gastroesophageal reflux disease 08/16/2016   • Irritable bowel syndrome 08/16/2016   • Disorder of prostate 08/16/2016   • Preventative health care 08/18/2016   • Osteoarthritis, knee 01/23/2017   • Chronic anxiety 01/26/2017   • Fasting hyperglycemia 05/04/2017   • Shingles 05/22/2018   • Neuralgia 05/22/2018   • Epigastric pain 10/04/2019   • Abnormal CAT scan 10/04/2019     Resolved Ambulatory Problems     Diagnosis Date Noted   • Abnormal electrocardiogram 08/16/2016   • Serum creatinine raised 08/16/2016   • Detrusor muscle hypertonia 08/16/2016   • Pain in pelvis 08/16/2016   • Chronic abdominal pain 10/17/2016   • Chronic pain 01/23/2017   • Neck pain 02/01/2017     Past Medical History:   Diagnosis Date   • Allergic rhinitis Lifelong   • Anal fissure 2006   • Chronic gastric erosion 2014   • Colon adenomas 2007   •  GERD (gastroesophageal reflux disease)    • IBS (irritable bowel syndrome)    • Overactive bladder 2013   • Pelvic pain    • Prediabetes 2017     Past Surgical History:   Procedure Laterality Date   • APPENDECTOMY     • CHOLECYSTECTOMY     • COLONOSCOPY  2017    Normal study   • COLONOSCOPY W/ POLYPECTOMY      With rectal adenoma   • KNEE ARTHROSCOPY Right 2016    ACL repair   • KNEE SURGERY Left 2008    ACL repair     Family History   Problem Relation Age of Onset   • Diabetes type II Mother 60   • Prostate cancer Maternal Grandfather    • No Known Problems Father    • No Known Problems Sister    • No Known Problems Brother    • Lung cancer Paternal Grandfather      Social History     Socioeconomic History   • Marital status:      Spouse name: Not on file   • Number of children: Not on file   • Years of education: Not on file   • Highest education level: Not on file   Tobacco Use   • Smoking status: Former Smoker     Packs/day: 0.10     Years: 10.00     Pack years: 1.00     Types: Cigarettes     Start date:      Quit date:      Years since quittin.1   • Smokeless tobacco: Never Used   Substance and Sexual Activity   • Alcohol use: Yes     Alcohol/week: 6.0 standard drinks     Types: 6 Shots of liquor per week   • Drug use: No   • Sexual activity: Defer   Social History Narrative    Domestic life     lives with wife and daughter        Zoroastrianism    Yarsani        Sleep hygiene in bed 11 PM to 7 AM for 7 hours broken sleep         Caffeine use   1 cup of coffee or tea daily        Exercise habits    Upper body strengthening.  Elliptical Glider 3 days weekly        Diet     low-calorie diabetic diet - low in salt low in sugar -  yogurt daily        Occupation      - 50 hours weekly        Hearing  no  impairment        Vision    corrects with reading glasses         Driving    no limitation       Review of Systems  Review of Systems -  General ROS: negative for  - chills, fever or night sweats  Cardiovascular ROS: no chest pain or dyspnea on exertion  Gastrointestinal ROS: no abdominal pain, change in bowel habits, or black or bloody stools  Genito-Urinary ROS: no dysuria, trouble voiding, or hematuria    Objective   There were no vitals taken for this visit.  Vitals obtained from patient if available  Physical Exam  Const: Non-toxic appearing, NAD, A&Ox4, Pleasant, Cooperative  Eyes: EOMI, no conjunctivitis  ENT: No copious nasal drainage noted  Cardiac: Regular rate by pulse  Resp: Respiratory rate observed to be within normal limits, no increased work of breathing observed, no audible wheezing or cough noted  Psych: Appropriate mood and behavior.  Procedures  Assessment/Plan   Problem List Items Addressed This Visit     None      Visit Diagnoses     Acute swimmer's ear of right side    -  Primary    Relevant Medications    ciprofloxacin-hydrocortisone (Cipro HC) 0.2-1 % otic suspension          See patient diagnoses and orders along with patient instructions for assessment, plan, and changes to care for patient.    This visit was conducted via telemedicine with live video and audio provided through Video Options: MyChart/Zoom at the point of care.    Patient Instructions   1.  Follow up if no better in 1 week, otherwise followup in summer for annual      Return in about 4 months (around 6/10/2021) for (fasting blood work 1 week prior to appt), Annual.    Ambulatory progress note signed and attested to by Aditya Collado D.O.

## 2021-04-30 ENCOUNTER — LAB (OUTPATIENT)
Dept: LAB | Facility: HOSPITAL | Age: 46
End: 2021-04-30

## 2021-04-30 ENCOUNTER — OFFICE VISIT (OUTPATIENT)
Dept: FAMILY MEDICINE CLINIC | Facility: CLINIC | Age: 46
End: 2021-04-30

## 2021-04-30 VITALS
WEIGHT: 214 LBS | DIASTOLIC BLOOD PRESSURE: 80 MMHG | BODY MASS INDEX: 27.46 KG/M2 | HEIGHT: 74 IN | HEART RATE: 79 BPM | OXYGEN SATURATION: 98 % | SYSTOLIC BLOOD PRESSURE: 112 MMHG

## 2021-04-30 DIAGNOSIS — Z00.00 WELL ADULT EXAM: ICD-10-CM

## 2021-04-30 DIAGNOSIS — R73.01 FASTING HYPERGLYCEMIA: ICD-10-CM

## 2021-04-30 DIAGNOSIS — N63.0 BREAST MASS IN MALE: ICD-10-CM

## 2021-04-30 DIAGNOSIS — Z00.00 WELL ADULT EXAM: Primary | ICD-10-CM

## 2021-04-30 LAB
ALBUMIN SERPL-MCNC: 4.6 G/DL (ref 3.5–5.2)
ALBUMIN/GLOB SERPL: 1.7 G/DL
ALP SERPL-CCNC: 48 U/L (ref 39–117)
ALT SERPL W P-5'-P-CCNC: 21 U/L (ref 1–41)
ANION GAP SERPL CALCULATED.3IONS-SCNC: 9 MMOL/L (ref 5–15)
AST SERPL-CCNC: 21 U/L (ref 1–40)
BILIRUB SERPL-MCNC: 0.3 MG/DL (ref 0–1.2)
BILIRUB UR QL STRIP: NEGATIVE
BUN SERPL-MCNC: 17 MG/DL (ref 6–20)
BUN/CREAT SERPL: 17 (ref 7–25)
CALCIUM SPEC-SCNC: 9.1 MG/DL (ref 8.6–10.5)
CHLORIDE SERPL-SCNC: 101 MMOL/L (ref 98–107)
CHOLEST SERPL-MCNC: 167 MG/DL (ref 0–200)
CLARITY UR: CLEAR
CO2 SERPL-SCNC: 27 MMOL/L (ref 22–29)
COLOR UR: YELLOW
CREAT SERPL-MCNC: 1 MG/DL (ref 0.76–1.27)
DEPRECATED RDW RBC AUTO: 41.3 FL (ref 37–54)
ERYTHROCYTE [DISTWIDTH] IN BLOOD BY AUTOMATED COUNT: 12.5 % (ref 12.3–15.4)
GFR SERPL CREATININE-BSD FRML MDRD: 80 ML/MIN/1.73
GLOBULIN UR ELPH-MCNC: 2.7 GM/DL
GLUCOSE SERPL-MCNC: 112 MG/DL (ref 65–99)
GLUCOSE UR STRIP-MCNC: NEGATIVE MG/DL
HBA1C MFR BLD: 5.6 % (ref 4.8–5.6)
HCT VFR BLD AUTO: 46.1 % (ref 37.5–51)
HDLC SERPL-MCNC: 57 MG/DL (ref 40–60)
HGB BLD-MCNC: 15.9 G/DL (ref 13–17.7)
HGB UR QL STRIP.AUTO: NEGATIVE
KETONES UR QL STRIP: NEGATIVE
LDLC SERPL CALC-MCNC: 100 MG/DL (ref 0–100)
LDLC/HDLC SERPL: 1.75 {RATIO}
LEUKOCYTE ESTERASE UR QL STRIP.AUTO: NEGATIVE
MCH RBC QN AUTO: 31.7 PG (ref 26.6–33)
MCHC RBC AUTO-ENTMCNC: 34.5 G/DL (ref 31.5–35.7)
MCV RBC AUTO: 91.8 FL (ref 79–97)
NITRITE UR QL STRIP: NEGATIVE
PH UR STRIP.AUTO: 7 [PH] (ref 5–8)
PLATELET # BLD AUTO: 212 10*3/MM3 (ref 140–450)
PMV BLD AUTO: 10.7 FL (ref 6–12)
POTASSIUM SERPL-SCNC: 4.3 MMOL/L (ref 3.5–5.2)
PROT SERPL-MCNC: 7.3 G/DL (ref 6–8.5)
PROT UR QL STRIP: NEGATIVE
RBC # BLD AUTO: 5.02 10*6/MM3 (ref 4.14–5.8)
SODIUM SERPL-SCNC: 137 MMOL/L (ref 136–145)
SP GR UR STRIP: 1.01 (ref 1–1.03)
TRIGL SERPL-MCNC: 50 MG/DL (ref 0–150)
TSH SERPL DL<=0.05 MIU/L-ACNC: 1.1 UIU/ML (ref 0.27–4.2)
UROBILINOGEN UR QL STRIP: NORMAL
VLDLC SERPL-MCNC: 10 MG/DL (ref 5–40)
WBC # BLD AUTO: 6.08 10*3/MM3 (ref 3.4–10.8)

## 2021-04-30 PROCEDURE — 84443 ASSAY THYROID STIM HORMONE: CPT

## 2021-04-30 PROCEDURE — 80053 COMPREHEN METABOLIC PANEL: CPT

## 2021-04-30 PROCEDURE — 99396 PREV VISIT EST AGE 40-64: CPT | Performed by: FAMILY MEDICINE

## 2021-04-30 PROCEDURE — 83036 HEMOGLOBIN GLYCOSYLATED A1C: CPT

## 2021-04-30 PROCEDURE — 81003 URINALYSIS AUTO W/O SCOPE: CPT

## 2021-04-30 PROCEDURE — 80061 LIPID PANEL: CPT

## 2021-04-30 PROCEDURE — 85027 COMPLETE CBC AUTOMATED: CPT

## 2021-05-21 ENCOUNTER — HOSPITAL ENCOUNTER (OUTPATIENT)
Dept: MAMMOGRAPHY | Facility: HOSPITAL | Age: 46
Discharge: HOME | End: 2021-05-21

## 2021-05-21 ENCOUNTER — HOSPITAL ENCOUNTER (OUTPATIENT)
Dept: ULTRASOUND IMAGING | Facility: HOSPITAL | Age: 46
Discharge: HOME | End: 2021-05-21

## 2021-05-21 DIAGNOSIS — N63.0 BREAST MASS IN MALE: ICD-10-CM

## 2021-05-21 PROCEDURE — G0279 TOMOSYNTHESIS, MAMMO: HCPCS

## 2021-05-21 PROCEDURE — 77066 DX MAMMO INCL CAD BI: CPT | Performed by: RADIOLOGY

## 2021-05-21 PROCEDURE — 77062 BREAST TOMOSYNTHESIS BI: CPT | Performed by: RADIOLOGY

## 2021-05-21 PROCEDURE — 77066 DX MAMMO INCL CAD BI: CPT

## 2021-05-26 DIAGNOSIS — N62 GYNECOMASTIA, MALE: Primary | ICD-10-CM

## 2021-10-06 DIAGNOSIS — K21.9 GASTROESOPHAGEAL REFLUX DISEASE WITHOUT ESOPHAGITIS: ICD-10-CM

## 2021-10-06 RX ORDER — DEXLANSOPRAZOLE 60 MG/1
60 CAPSULE, DELAYED RELEASE ORAL DAILY
Qty: 90 CAPSULE | Refills: 1 | Status: SHIPPED | OUTPATIENT
Start: 2021-10-06 | End: 2022-03-24

## 2021-10-06 NOTE — TELEPHONE ENCOUNTER
Rx Refill Note  Requested Prescriptions     Pending Prescriptions Disp Refills   • Dexilant 60 MG capsule [Pharmacy Med Name: DEXILANT 60MG CAP(FORMERLY KAPIDEX)] 30 capsule 11     Sig: TAKE 1 CAPSULE BY MOUTH DAILY      Last office visit with prescribing clinician: 4/30/2021      Next office visit with prescribing clinician: 5/2/2022   }  Viridiana Velasco MA  10/06/21, 09:15 EDT     Last fill: 10/12/2020

## 2021-10-18 RX ORDER — AZELASTINE HYDROCHLORIDE AND FLUTICASONE PROPIONATE 137; 50 UG/1; UG/1
1 SPRAY, METERED NASAL DAILY
Qty: 23 G | Refills: 11 | Status: SHIPPED | OUTPATIENT
Start: 2021-10-18 | End: 2022-04-05 | Stop reason: SDUPTHER

## 2021-10-18 NOTE — TELEPHONE ENCOUNTER
Caller: Yony Parsons    Relationship: Self    Medication requested (name and dosage): Dymista 137-50 MCG/ACT suspension    Pharmacy where request should be sent: [facility]     Additional details provided by patient:     Best call back number: [phone]    Does the patient have less than a 3 day supply:  [x] Yes  [] No    SiBrandon Fletcher Rep   10/18/21 12:08 EDT

## 2021-10-18 NOTE — TELEPHONE ENCOUNTER
Rx Refill Note  Requested Prescriptions     Pending Prescriptions Disp Refills   • Dymista 137-50 MCG/ACT suspension 23 g 11     Si spray by Each Nare route Daily.      Last office visit with prescribing clinician: 2021      Next office visit with prescribing clinician: 2022            Vivien Rodriguez MA  10/18/21, 12:32 EDT

## 2022-02-24 ENCOUNTER — TELEMEDICINE (OUTPATIENT)
Dept: FAMILY MEDICINE CLINIC | Facility: CLINIC | Age: 47
End: 2022-02-24

## 2022-02-24 DIAGNOSIS — R05.3 PERSISTENT COUGH: Primary | ICD-10-CM

## 2022-02-24 PROCEDURE — 99213 OFFICE O/P EST LOW 20 MIN: CPT | Performed by: FAMILY MEDICINE

## 2022-02-24 RX ORDER — AMOXICILLIN AND CLAVULANATE POTASSIUM 875; 125 MG/1; MG/1
1 TABLET, FILM COATED ORAL 2 TIMES DAILY
Qty: 14 TABLET | Refills: 0 | Status: SHIPPED | OUTPATIENT
Start: 2022-02-24 | End: 2022-03-03

## 2022-02-24 RX ORDER — BENZONATATE 100 MG/1
100 CAPSULE ORAL 3 TIMES DAILY PRN
Qty: 30 CAPSULE | Refills: 0 | Status: SHIPPED | OUTPATIENT
Start: 2022-02-24 | End: 2022-03-06

## 2022-03-24 ENCOUNTER — TELEPHONE (OUTPATIENT)
Dept: FAMILY MEDICINE CLINIC | Facility: CLINIC | Age: 47
End: 2022-03-24

## 2022-03-24 ENCOUNTER — PRIOR AUTHORIZATION (OUTPATIENT)
Dept: FAMILY MEDICINE CLINIC | Facility: CLINIC | Age: 47
End: 2022-03-24

## 2022-03-24 DIAGNOSIS — K21.9 GASTROESOPHAGEAL REFLUX DISEASE WITHOUT ESOPHAGITIS: ICD-10-CM

## 2022-03-24 RX ORDER — DEXLANSOPRAZOLE 60 MG/1
CAPSULE, DELAYED RELEASE ORAL
Qty: 90 CAPSULE | Refills: 1 | Status: SHIPPED | OUTPATIENT
Start: 2022-03-24 | End: 2022-06-24 | Stop reason: SDUPTHER

## 2022-03-24 RX ORDER — DEXLANSOPRAZOLE 60 MG/1
CAPSULE, DELAYED RELEASE ORAL
Qty: 90 CAPSULE | Refills: 1 | Status: SHIPPED | OUTPATIENT
Start: 2022-03-24 | End: 2022-08-11 | Stop reason: SDUPTHER

## 2022-03-24 NOTE — TELEPHONE ENCOUNTER
Completed Prior Authorization for Dexilant    KEY: [id number]  Member recently filled this med, no further activity needed        Preferred Alternatives;   Famotidine 40   Omeprazole 20   Rabeprazole       This script was sent twice. Once script was denied, the other was filled.

## 2022-03-24 NOTE — TELEPHONE ENCOUNTER
Caller: Yony Parsons    Relationship to patient: Self    Best call back number: [phone]    What is the call regarding:  PATIENT IS NEEDING A PRIOR AUTHORIZATION FOR DEXILANT.

## 2022-04-01 ENCOUNTER — TELEPHONE (OUTPATIENT)
Dept: FAMILY MEDICINE CLINIC | Facility: CLINIC | Age: 47
End: 2022-04-01

## 2022-04-05 ENCOUNTER — TELEPHONE (OUTPATIENT)
Dept: FAMILY MEDICINE CLINIC | Facility: CLINIC | Age: 47
End: 2022-04-05

## 2022-04-05 RX ORDER — AZELASTINE HYDROCHLORIDE, FLUTICASONE PROPIONATE 137; 50 UG/1; UG/1
1 SPRAY, METERED NASAL DAILY
Qty: 23 G | Refills: 11 | Status: SHIPPED | OUTPATIENT
Start: 2022-04-05 | End: 2022-06-24 | Stop reason: SDUPTHER

## 2022-04-05 NOTE — TELEPHONE ENCOUNTER
Caller: Yony Parsons    Relationship: Self    Best call back number: [phone]     What is the best time to reach you: ANY TIME     Who are you requesting to speak with (clinical staff, provider,  specific staff member):  CLINICAL STAFF    Do you know the name of the person who called: PATIENT     What was the call regarding:  REGARDING HIS NASAL SPRAY PLEASE CALL AND ADVISE ON THIS MEDICATION, PRESCRIPTION IS NOT WRITTEN FOR GENERIC AND THAT WOULD WOULD WORK WORK WELL IF POSSIBLE.       Do you require a callback: YES PLEASE CALL AND ADVISE

## 2022-04-05 NOTE — TELEPHONE ENCOUNTER
Pt called back and insurance will not cover - he is willing to try a generic if it available.     Sending in generic.

## 2022-06-24 ENCOUNTER — TELEPHONE (OUTPATIENT)
Dept: FAMILY MEDICINE CLINIC | Facility: CLINIC | Age: 47
End: 2022-06-24

## 2022-06-24 ENCOUNTER — OFFICE VISIT (OUTPATIENT)
Dept: FAMILY MEDICINE CLINIC | Facility: CLINIC | Age: 47
End: 2022-06-24

## 2022-06-24 VITALS
BODY MASS INDEX: 28.75 KG/M2 | HEART RATE: 84 BPM | WEIGHT: 224 LBS | DIASTOLIC BLOOD PRESSURE: 82 MMHG | OXYGEN SATURATION: 98 % | HEIGHT: 74 IN | TEMPERATURE: 97.5 F | SYSTOLIC BLOOD PRESSURE: 124 MMHG

## 2022-06-24 DIAGNOSIS — R06.81 WITNESSED EPISODE OF APNEA: ICD-10-CM

## 2022-06-24 DIAGNOSIS — R06.83 SNORING: ICD-10-CM

## 2022-06-24 DIAGNOSIS — Z00.00 WELL ADULT EXAM: Primary | ICD-10-CM

## 2022-06-24 DIAGNOSIS — K21.9 GASTROESOPHAGEAL REFLUX DISEASE WITHOUT ESOPHAGITIS: ICD-10-CM

## 2022-06-24 DIAGNOSIS — J30.2 SEASONAL ALLERGIC RHINITIS, UNSPECIFIED TRIGGER: ICD-10-CM

## 2022-06-24 DIAGNOSIS — R73.01 FASTING HYPERGLYCEMIA: ICD-10-CM

## 2022-06-24 PROCEDURE — 99396 PREV VISIT EST AGE 40-64: CPT | Performed by: FAMILY MEDICINE

## 2022-06-24 RX ORDER — AZELASTINE HYDROCHLORIDE AND FLUTICASONE PROPIONATE 137; 50 UG/1; UG/1
1 SPRAY, METERED NASAL DAILY
Qty: 23 G | Refills: 11 | Status: SHIPPED | OUTPATIENT
Start: 2022-06-24

## 2022-06-24 NOTE — PROGRESS NOTES
Annual Well Adult Visit     Patient Name: Yony Parsons  : 1975   MRN: [id number]   Care Team: Patient Care Team:  Aditya Collado DO as PCP - General (Family Medicine)    Chief Complaint:    Chief Complaint   Patient presents with   • Annual Exam       History of Present Illness: Yony Parsons is a 47 y.o. male who presents today for annual physical exam and preventative care.    HPI    The patient reports that he has been doing well. He states that he has been experiencing extra bad allergies this year. The patient switched to generic allergy medication. He notes for periods he takes Zyrtec of Claritin.     He denies any new medications or changes.     He reports that the Dexilant is working great for his acid reflux. He notes that he is taking 60 mg of Dexilant.  He has gotten his Dexilant refilled. He will try to tweak it and cut back a little, but it seems to be the right dosage for him. He denies any cold sore flares. He denies any supplements or vitamins.    He states that he wakes up in the middle of the night. He notes that he does not sleep through the night as well. He reports that he takes some melatonin at night, and that seems to help a little bit. He notes snoring. The patient states when he wakes up in the middle of the night he is on his back.  He reports if he takes melatonin he goes back to sleep easily. He denies feeling rested when waking. He quit drinking coffee this week and it has made a little bit of a difference. He notes that he tries to have 1 or 2 cups of coffee in the morning, and sometimes he will have 1 after lunch, but he never drinks in the evening. He states that he will have some wine at night. The patient likes to drink wine 3 to 4 nights a week. He denies any urinary problems.     He reports that he took a weekend when Covid-19 started and decided to trim all of his trees in the backyard. He states that he developed real bad tendinitis in both elbows,  and it has been off and on for a couple of years. He notes that it is not too bad now. He reports that he was given some exercises at his last visit, but the pain comes and goes. He states that today is not a bad day.      This patient is accompanied by their self who contributes to the history of their care.    The following portions of the patient's history were reviewed and updated as appropriate: allergies, current medications, past family history, past medical history, past social history, past surgical history and problem list.    Subjective      Review of Systems:   Review of Systems - See HPI    Past Medical History:   Past Medical History:   Diagnosis Date   • Allergic     seasonal allergies   • Allergic rhinitis Lifelong    especially fall   • Anal fissure 2006   • Chronic gastric erosion 2014    EGD positive for gastritis and esophagitis   • Colon adenomas 2007    With rectal adenoma   • GERD (gastroesophageal reflux disease) 2006    Severe refractory   • IBS (irritable bowel syndrome) 2006   • Overactive bladder 2013   • Pelvic pain 2010    Negative urologic workup   • Prediabetes 2017    Hemoglobin A1c 6.2   • Shingles 05/2018    Right T10 and T12 dermatomes       Past Surgical History:   Past Surgical History:   Procedure Laterality Date   • APPENDECTOMY  1992   • CHOLECYSTECTOMY  2006   • COLONOSCOPY  2017    Normal study   • COLONOSCOPY W/ POLYPECTOMY  2007    With rectal adenoma   • KNEE ARTHROSCOPY Right 2016    ACL repair   • KNEE SURGERY Left 2008    ACL repair       Family History:   Family History   Problem Relation Age of Onset   • Diabetes type II Mother 60   • Prostate cancer Maternal Grandfather    • No Known Problems Father    • No Known Problems Sister    • No Known Problems Brother    • Lung cancer Paternal Grandfather    • Breast cancer Neg Hx    • Ovarian cancer Neg Hx        Social History:   Social History     Socioeconomic History   • Marital status:    Tobacco Use   •  "Smoking status: Former Smoker     Packs/day: 0.10     Years: 10.00     Pack years: 1.00     Types: Cigarettes     Start date: 1/1/1997     Quit date: 1/1/2007     Years since quitting: 15.4   • Smokeless tobacco: Never Used   Substance and Sexual Activity   • Alcohol use: Yes     Alcohol/week: 4.0 standard drinks     Types: 4 Cans of beer per week     Comment: light social drinking   • Drug use: No   • Sexual activity: Defer       Tobacco History:   Social History     Tobacco Use   Smoking Status Former Smoker   • Packs/day: 0.10   • Years: 10.00   • Pack years: 1.00   • Types: Cigarettes   • Start date: 1/1/1997   • Quit date: 1/1/2007   • Years since quitting: 15.4   Smokeless Tobacco Never Used       Medications:     Current Outpatient Medications:   •  calcium carbonate (TUMS) 500 MG chewable tablet, Chew 1 tablet As Needed., Disp: , Rfl:   •  dexlansoprazole (DEXILANT) 60 MG capsule, TAKE 1 CAPSULE BY MOUTH DAILY, Disp: 90 capsule, Rfl: 1  •  diclofenac (VOLTAREN) 1 % gel gel, apply 2 grams to affected area ON UPPER EXTREMITIES four times a ...  (REFER TO PRESCRIPTION NOTES)., Disp: , Rfl: 0  •  Dymista 137-50 MCG/ACT suspension, 1 spray by Each Nare route Daily., Disp: 23 g, Rfl: 11  •  Multiple Vitamins-Minerals (MENS MULTI VITAMIN & MINERAL PO), Take 1 tablet by mouth Every Morning., Disp: , Rfl:   •  valACYclovir (VALTREX) 1000 MG tablet, Take 1 tablet by mouth 3 (Three) Times a Day., Disp: 15 tablet, Rfl: 0    Allergies:   Allergies   Allergen Reactions   • No Known Drug Allergy        Objective   Objective     Physical Exam:  Vital Signs:   Vitals:    06/24/22 1359   BP: 124/82   Pulse: 84   Temp: 97.5 °F (36.4 °C)   SpO2: 98%   Weight: 102 kg (224 lb)   Height: 188 cm (74.02\")   PainSc: 0-No pain     Body mass index is 28.74 kg/m².     Physical Exam  Nursing note reviewed  General: Patient is well-nourished, well-developed, and in no acute distress.  HEENT: Normocephalic with no contusions noted, no " ptosis or palsy. Pupils equally round and reactive to light, extraocular movements intact. Patient holds steady gaze, can follow to midline. Hearing grossly normal without deficit, exterior auricles normal, tympanic membranes normal without erythema or bulging.  Lymphatic: Posterior auricular, cervical, submandibular, supraclavicular, axillary lymphatic sites palpated without abnormality  Cardiovascular: Normal study rate without ectopy. PMI palpated, normal. Normal S1, S2. No murmurs rubs or gallops.  Respiratory: No tenderness to palpation on the chest wall, lungs clear to auscultation bilaterally, no wheezes, rales, or rhonchi. No pleural friction rubs.  Gastrointestinal: Bowel sounds present, normoactive globally. No bruit noted. Nontender, nondistended. Normal percussive exam, no hepatomegaly, no splenomegaly. No hernias, scars, gross lesions.  Extremities: No cyanosis or edema, 2+ pulses bilaterally, reflexes normal. Capillary refill time normal.  MSK: Normal gait and station. 5/5 strength globally.  Neuro: Cranial nerves II-XII grossly intact. Patient alert and oriented x3.  PHQ-9 Depression Screening  Little interest or pleasure in doing things?     Feeling down, depressed, or hopeless?     Trouble falling or staying asleep, or sleeping too much?     Feeling tired or having little energy?     Poor appetite or overeating?     Feeling bad about yourself - or that you are a failure or have let yourself or your family down?     Trouble concentrating on things, such as reading the newspaper or watching television?     Moving or speaking so slowly that other people could have noticed? Or the opposite - being so fidgety or restless that you have been moving around a lot more than usual?     Thoughts that you would be better off dead, or of hurting yourself in some way?     PHQ-9 Total Score     If you checked off any problems, how difficult have these problems made it for you to do your work, take care of things at  home, or get along with other people?       Procedures/Radiology     Procedures  No radiology results for the last 7 days     Assessment & Plan   Assessment / Plan      Assessment/Plan:   Problems Addressed This Visit  Diagnoses and all orders for this visit:    1. Well adult exam (Primary)  -     CBC (No Diff); Future  -     Lipid Panel; Future  -     Hemoglobin A1c; Future  -     Comprehensive Metabolic Panel; Future  -     Urinalysis With Microscopic If Indicated (No Culture) - Urine, Clean Catch; Future  -     TSH; Future  -     Testosterone; Future    2. Fasting hyperglycemia  -     Hemoglobin A1c; Future    3. Witnessed episode of apnea  -     Home Sleep Study; Future    4. Gastroesophageal reflux disease without esophagitis  Assessment & Plan:  The patient will continue taking Dexilant 60 mg daily.        5. Snoring  Comments:  Sleep study ordered. Advised to stop alcohol for 1 week and sleep on his side.   Orders:  -     Home Sleep Study; Future    6. Seasonal allergic rhinitis, unspecified trigger  -     Dymista 137-50 MCG/ACT suspension; 1 spray by Each Nare route Daily.  Dispense: 23 g; Refill: 11    Problem List Items Addressed This Visit        Allergies and Adverse Reactions    Atopic rhinitis    Relevant Medications    Dymista 137-50 MCG/ACT suspension       Endocrine and Metabolic    Fasting hyperglycemia    Relevant Orders    Hemoglobin A1c       Gastrointestinal Abdominal     Gastroesophageal reflux disease    Current Assessment & Plan     The patient will continue taking Dexilant 60 mg daily.               Other Visit Diagnoses     Well adult exam    -  Primary    Relevant Orders    CBC (No Diff)    Lipid Panel    Hemoglobin A1c    Comprehensive Metabolic Panel    Urinalysis With Microscopic If Indicated (No Culture) - Urine, Clean Catch    TSH    Testosterone    Witnessed episode of apnea        Relevant Orders    Home Sleep Study    Snoring        Sleep study ordered. Advised to stop alcohol for  1 week and sleep on his side.     Relevant Orders    Home Sleep Study          Bilateral elbow pain.  - I advised the patient to rest his bilateral elbows. I advised the patient to work on strengthening the upper back and shoulder external rotators.    See patient diagnoses and orders along with patient instructions for assessment, plan, and changes to care for patient.    The preventative exam has been reviewed in detail.  The patient has been fully counseled on preventative guidelines for vaccines, cancer screenings, and other health maintenance needs. The patient was counseled on maintaining a lifestyle to promote good health and to minimize chronic diseases.  The patient has been assisted with scheduling healthcare procedures for the coming year and given a written document outlining these recommendations. Age-appropriate screening measures have been ordered for the patient today as indicated above.    There are no Patient Instructions on file for this visit.    Follow Up:   Return in about 1 year (around 6/24/2023) for Annual.      DO JOSE JUAN Ortiz RD  [facility]  [address] [facility] [address]  Fax [phone]  Phone [phone]        Transcribed from ambient dictation for Aditya Collado DO by Emilie Nunez.  06/24/22   17:36 EDT    Patient verbalized consent to the visit recording.

## 2022-06-24 NOTE — TELEPHONE ENCOUNTER
Provider: ALBERT     Caller: BECKY OKEEFE     Phone Number: [phone]    Reason for Call: PATIENT WANTS TO MAKE SURE THAT HIS TESTOSTERONE IS BEING CHECKED WITH HIS LABS.

## 2022-06-28 ENCOUNTER — LAB (OUTPATIENT)
Dept: LAB | Facility: HOSPITAL | Age: 47
End: 2022-06-28

## 2022-06-28 DIAGNOSIS — R73.01 FASTING HYPERGLYCEMIA: ICD-10-CM

## 2022-06-28 DIAGNOSIS — Z00.00 WELL ADULT EXAM: ICD-10-CM

## 2022-06-28 LAB
ALBUMIN SERPL-MCNC: 4.5 G/DL (ref 3.5–5.2)
ALBUMIN/GLOB SERPL: 1.9 G/DL
ALP SERPL-CCNC: 52 U/L (ref 39–117)
ALT SERPL W P-5'-P-CCNC: 39 U/L (ref 1–41)
ANION GAP SERPL CALCULATED.3IONS-SCNC: 10.7 MMOL/L (ref 5–15)
AST SERPL-CCNC: 32 U/L (ref 1–40)
BILIRUB SERPL-MCNC: 0.5 MG/DL (ref 0–1.2)
BILIRUB UR QL STRIP: NEGATIVE
BUN SERPL-MCNC: 14 MG/DL (ref 6–20)
BUN/CREAT SERPL: 12.2 (ref 7–25)
CALCIUM SPEC-SCNC: 9.4 MG/DL (ref 8.6–10.5)
CHLORIDE SERPL-SCNC: 102 MMOL/L (ref 98–107)
CHOLEST SERPL-MCNC: 167 MG/DL (ref 0–200)
CLARITY UR: CLEAR
CO2 SERPL-SCNC: 26.3 MMOL/L (ref 22–29)
COLOR UR: YELLOW
CREAT SERPL-MCNC: 1.15 MG/DL (ref 0.76–1.27)
DEPRECATED RDW RBC AUTO: 43.3 FL (ref 37–54)
EGFRCR SERPLBLD CKD-EPI 2021: 78.5 ML/MIN/1.73
ERYTHROCYTE [DISTWIDTH] IN BLOOD BY AUTOMATED COUNT: 12.9 % (ref 12.3–15.4)
GLOBULIN UR ELPH-MCNC: 2.4 GM/DL
GLUCOSE SERPL-MCNC: 105 MG/DL (ref 65–99)
GLUCOSE UR STRIP-MCNC: NEGATIVE MG/DL
HBA1C MFR BLD: 5.7 % (ref 4.8–5.6)
HCT VFR BLD AUTO: 45.9 % (ref 37.5–51)
HDLC SERPL-MCNC: 54 MG/DL (ref 40–60)
HGB BLD-MCNC: 15.5 G/DL (ref 13–17.7)
HGB UR QL STRIP.AUTO: NEGATIVE
KETONES UR QL STRIP: NEGATIVE
LDLC SERPL CALC-MCNC: 97 MG/DL (ref 0–100)
LDLC/HDLC SERPL: 1.78 {RATIO}
LEUKOCYTE ESTERASE UR QL STRIP.AUTO: NEGATIVE
MCH RBC QN AUTO: 31 PG (ref 26.6–33)
MCHC RBC AUTO-ENTMCNC: 33.8 G/DL (ref 31.5–35.7)
MCV RBC AUTO: 91.8 FL (ref 79–97)
NITRITE UR QL STRIP: NEGATIVE
PH UR STRIP.AUTO: 7 [PH] (ref 5–8)
PLATELET # BLD AUTO: 218 10*3/MM3 (ref 140–450)
PMV BLD AUTO: 10.4 FL (ref 6–12)
POTASSIUM SERPL-SCNC: 4.8 MMOL/L (ref 3.5–5.2)
PROT SERPL-MCNC: 6.9 G/DL (ref 6–8.5)
PROT UR QL STRIP: NEGATIVE
RBC # BLD AUTO: 5 10*6/MM3 (ref 4.14–5.8)
SODIUM SERPL-SCNC: 139 MMOL/L (ref 136–145)
SP GR UR STRIP: 1.01 (ref 1–1.03)
TESTOST SERPL-MCNC: 344 NG/DL (ref 249–836)
TRIGL SERPL-MCNC: 84 MG/DL (ref 0–150)
TSH SERPL DL<=0.05 MIU/L-ACNC: 1.88 UIU/ML (ref 0.27–4.2)
UROBILINOGEN UR QL STRIP: NORMAL
VLDLC SERPL-MCNC: 16 MG/DL (ref 5–40)
WBC NRBC COR # BLD: 6.41 10*3/MM3 (ref 3.4–10.8)

## 2022-06-28 PROCEDURE — 81003 URINALYSIS AUTO W/O SCOPE: CPT | Performed by: FAMILY MEDICINE

## 2022-06-28 PROCEDURE — 80061 LIPID PANEL: CPT | Performed by: FAMILY MEDICINE

## 2022-06-28 PROCEDURE — 80050 GENERAL HEALTH PANEL: CPT | Performed by: FAMILY MEDICINE

## 2022-06-28 PROCEDURE — 83036 HEMOGLOBIN GLYCOSYLATED A1C: CPT | Performed by: FAMILY MEDICINE

## 2022-06-28 PROCEDURE — 84403 ASSAY OF TOTAL TESTOSTERONE: CPT | Performed by: FAMILY MEDICINE

## 2022-06-29 ENCOUNTER — PRIOR AUTHORIZATION (OUTPATIENT)
Dept: FAMILY MEDICINE CLINIC | Facility: CLINIC | Age: 47
End: 2022-06-29

## 2022-08-08 ENCOUNTER — TELEPHONE (OUTPATIENT)
Dept: FAMILY MEDICINE CLINIC | Facility: CLINIC | Age: 47
End: 2022-08-08

## 2022-08-08 DIAGNOSIS — K21.9 GASTROESOPHAGEAL REFLUX DISEASE WITHOUT ESOPHAGITIS: ICD-10-CM

## 2022-08-08 RX ORDER — DEXLANSOPRAZOLE 60 MG/1
1 CAPSULE, DELAYED RELEASE ORAL DAILY
Qty: 90 CAPSULE | Refills: 1 | Status: CANCELLED | OUTPATIENT
Start: 2022-08-08

## 2022-08-08 NOTE — TELEPHONE ENCOUNTER
Attempted to contact, left message advising patient once we receive further notification from pharmacy we will submit PA at the appropriate time.   Hub can relay and document

## 2022-08-08 NOTE — TELEPHONE ENCOUNTER
Caller: Yony Parsons    Relationship: Self    Best call back number: [phone]    Requested Prescriptions:   Requested Prescriptions     Pending Prescriptions Disp Refills   • dexlansoprazole (DEXILANT) 60 MG capsule 90 capsule 1     Sig: Take 1 capsule by mouth Daily.        Pharmacy where request should be sent: [id number] [address] [place] [address] DR [address] AT [address]  [phone] [facility] [phone]      Additional details provided by patient: PATIENT RECEIVED A LETTER IN THE MAIL ABOUT THIS MEDICATION POSSIBLY NOT BEING COVERED IN THE FUTURE. PATIENT STATED THIS HAS HAPPENED BEFORE AND HAS TRIED THE OTHER MEDICATION RECOMMENDATIONS. THESE MEDICATIONS DID NOT WORK AND HE WOULD LIKE TO KEEP THE DEXILANT. PATIENT WANTS TO KNOW IF HE CAN GET A PRIOR AUTHORIZATION FOR THIS MEDICATION IF NEEDED COME October.     Does the patient have less than a 3 day supply:  [] Yes  [x] No    Brandon Joshi Rep   08/08/22 10:16 EDT

## 2022-08-11 DIAGNOSIS — K21.9 GASTROESOPHAGEAL REFLUX DISEASE WITHOUT ESOPHAGITIS: ICD-10-CM

## 2022-08-11 RX ORDER — DEXLANSOPRAZOLE 60 MG/1
1 CAPSULE, DELAYED RELEASE ORAL DAILY
Qty: 90 CAPSULE | Refills: 0 | Status: SHIPPED | OUTPATIENT
Start: 2022-08-11 | End: 2022-08-17

## 2022-08-15 ENCOUNTER — TELEPHONE (OUTPATIENT)
Dept: FAMILY MEDICINE CLINIC | Facility: CLINIC | Age: 47
End: 2022-08-15

## 2022-08-15 DIAGNOSIS — K21.9 GASTROESOPHAGEAL REFLUX DISEASE WITHOUT ESOPHAGITIS: ICD-10-CM

## 2022-08-15 NOTE — TELEPHONE ENCOUNTER
Caller: Yony Parsons    Relationship: Self    Best call back number: [phone]     What medications are you currently taking:   Current Outpatient Medications on File Prior to Visit   Medication Sig Dispense Refill   • calcium carbonate (TUMS) 500 MG chewable tablet Chew 1 tablet As Needed.     • dexlansoprazole (DEXILANT) 60 MG capsule Take 1 capsule by mouth Daily. 90 capsule 0   • diclofenac (VOLTAREN) 1 % gel gel apply 2 grams to affected area ON UPPER EXTREMITIES four times a ...  (REFER TO PRESCRIPTION NOTES).  0   • Dymista 137-50 MCG/ACT suspension 1 spray by Each Nare route Daily. 23 g 11   • Multiple Vitamins-Minerals (MENS MULTI VITAMIN & MINERAL PO) Take 1 tablet by mouth Every Morning.     • valACYclovir (VALTREX) 1000 MG tablet Take 1 tablet by mouth 3 (Three) Times a Day. 15 tablet 0     No current facility-administered medications on file prior to visit.          When did you start taking these medications: THREE YEARS AGO    Which medication are you concerned about: dexlansoprazole (DEXILANT) 60 MG capsule    Who prescribed you this medication:DR PRICE    What are your concerns: THE PATIENT REPORTS THAT HE HAS HAD ISSUES WITH GENERIC MEDICATION IN THE PAST AND IS REQUESTING THIS MEDICATION TO RE-SENT IN AS NAME BRAND ONLY, NO SUBSTITUTES.     How long have you had these concerns: SINCE IT WAS SENT TO THE PHARMACY ON 08/11/2022,.    PLEASE CALL THE PATIENT AND ADVISE.

## 2022-08-17 RX ORDER — DEXLANSOPRAZOLE 60 MG/1
60 CAPSULE, DELAYED RELEASE ORAL DAILY
Qty: 90 CAPSULE | Refills: 3 | Status: SHIPPED | OUTPATIENT
Start: 2022-08-17 | End: 2023-02-27

## 2022-08-17 NOTE — TELEPHONE ENCOUNTER
Attempted to contact, no answer. Left detailed voicemail relaying provider's message     Name brand sent  Hub can relay and document.

## 2022-11-10 ENCOUNTER — TELEMEDICINE (OUTPATIENT)
Dept: FAMILY MEDICINE CLINIC | Facility: CLINIC | Age: 47
End: 2022-11-10

## 2022-11-10 DIAGNOSIS — M79.671 RIGHT FOOT PAIN: ICD-10-CM

## 2022-11-10 DIAGNOSIS — M25.551 CHRONIC RIGHT HIP PAIN: ICD-10-CM

## 2022-11-10 DIAGNOSIS — R05.3 PERSISTENT COUGH FOR 3 WEEKS OR LONGER: Primary | ICD-10-CM

## 2022-11-10 DIAGNOSIS — G89.29 CHRONIC RIGHT HIP PAIN: ICD-10-CM

## 2022-11-10 PROCEDURE — 99213 OFFICE O/P EST LOW 20 MIN: CPT | Performed by: FAMILY MEDICINE

## 2022-11-10 RX ORDER — BROMPHENIRAMINE MALEATE, PSEUDOEPHEDRINE HYDROCHLORIDE, AND DEXTROMETHORPHAN HYDROBROMIDE 2; 30; 10 MG/5ML; MG/5ML; MG/5ML
5 SYRUP ORAL 4 TIMES DAILY PRN
Qty: 118 ML | Refills: 1 | Status: SHIPPED | OUTPATIENT
Start: 2022-11-10 | End: 2022-11-17

## 2022-11-10 RX ORDER — ALBUTEROL SULFATE 90 UG/1
2 AEROSOL, METERED RESPIRATORY (INHALATION) EVERY 4 HOURS PRN
Qty: 8 G | Refills: 1 | Status: SHIPPED | OUTPATIENT
Start: 2022-11-10 | End: 2022-12-12

## 2022-11-10 RX ORDER — AZITHROMYCIN 250 MG/1
TABLET, FILM COATED ORAL
Qty: 6 TABLET | Refills: 0 | Status: SHIPPED | OUTPATIENT
Start: 2022-11-10 | End: 2022-11-15

## 2022-11-10 NOTE — PROGRESS NOTES
Subjective   Yony Parsons is a 47 y.o. male.     Chief Complaint   Patient presents with   • Cough       History of Present Illness     Yony Parsons presents today for   Chief Complaint   Patient presents with   • Cough     6 weeks ago contracted COVID, tested positive, got better after a couple weeks, then he traveled some and got another sore throat.    You have chosen to receive care through a telehealth visit.  Do you consent to use a video/audio connection for your medical care today? Yes    Patient location: [address] [place] [address]   Provider Location: [address] [place] [address]    The following portions of the patient's history were reviewed and updated as appropriate: allergies, current medications, past family history, past medical history, past social history, past surgical history and problem list.    Active Ambulatory Problems     Diagnosis Date Noted   • Atopic rhinitis 08/16/2016   • Chronic antral gastritis 08/16/2016   • Gastroesophageal reflux disease 08/16/2016   • Irritable bowel syndrome 08/16/2016   • Disorder of prostate 08/16/2016   • Preventative health care 08/18/2016   • Osteoarthritis, knee 01/23/2017   • Chronic anxiety 01/26/2017   • Fasting hyperglycemia 05/04/2017   • Shingles 05/22/2018   • Neuralgia 05/22/2018   • Epigastric pain 10/04/2019   • Abnormal CAT scan 10/04/2019     Resolved Ambulatory Problems     Diagnosis Date Noted   • Abnormal electrocardiogram 08/16/2016   • Serum creatinine raised 08/16/2016   • Detrusor muscle hypertonia 08/16/2016   • Pain in pelvis 08/16/2016   • Chronic abdominal pain 10/17/2016   • Chronic pain 01/23/2017   • Neck pain 02/01/2017     Past Medical History:   Diagnosis Date   • Allergic    • Allergic rhinitis Lifelong   • Anal fissure 2006   • Chronic gastric erosion 2014   • Colon adenomas 2007   • GERD (gastroesophageal reflux disease) 2006   • IBS (irritable bowel syndrome) 2006   • Overactive  bladder 2013   • Pelvic pain 2010   • Prediabetes 2017     Past Surgical History:   Procedure Laterality Date   • APPENDECTOMY  1992   • CHOLECYSTECTOMY  2006   • COLONOSCOPY  2017    Normal study   • COLONOSCOPY W/ POLYPECTOMY  2007    With rectal adenoma   • KNEE ARTHROSCOPY Right 2016    ACL repair   • KNEE SURGERY Left 2008    ACL repair     Family History   Problem Relation Age of Onset   • Diabetes type II Mother 60   • Prostate cancer Maternal Grandfather    • No Known Problems Father    • No Known Problems Sister    • No Known Problems Brother    • Lung cancer Paternal Grandfather    • Breast cancer Neg Hx    • Ovarian cancer Neg Hx      Social History     Socioeconomic History   • Marital status:    Tobacco Use   • Smoking status: Former     Packs/day: 0.10     Years: 10.00     Pack years: 1.00     Types: Cigarettes     Start date: 1/1/1997     Quit date: 1/1/2007     Years since quitting: 15.8   • Smokeless tobacco: Never   Substance and Sexual Activity   • Alcohol use: Yes     Alcohol/week: 4.0 standard drinks     Types: 4 Cans of beer per week     Comment: light social drinking   • Drug use: No   • Sexual activity: Defer       Review of Systems   Constitutional: Negative.  Negative for fever.   HENT: Positive for congestion. Negative for sinus pain and sore throat.    Respiratory: Positive for cough. Negative for chest tightness, shortness of breath and wheezing.    Cardiovascular: Negative for chest pain and palpitations.     Objective   There were no vitals taken for this visit.  Vitals obtained from patient if available  Physical Exam  Const: Non-toxic appearing, NAD, A&Ox4, Pleasant, Cooperative  Eyes: EOMI, no conjunctivitis  ENT: No copious nasal drainage noted  Cardiac: Regular rate by pulse  Resp: Respiratory rate observed to be within normal limits, no increased work of breathing observed, no audible wheezing or cough noted  Psych: Appropriate mood and behavior.  Procedures  Assessment &  Plan   Problem List Items Addressed This Visit    None  Visit Diagnoses     Persistent cough for 3 weeks or longer    -  Primary    Relevant Medications    azithromycin (Zithromax Z-Toro) 250 MG tablet    brompheniramine-pseudoephedrine-DM 30-2-10 MG/5ML syrup    albuterol sulfate  (90 Base) MCG/ACT inhaler    Right foot pain        Relevant Orders    XR Foot 3+ View Right    Chronic right hip pain            Also concerned about right knee. Had hamstring-graft ACL repair years ago, has never felt right, has had hip, knee, hamstring pain, foot pain. Medial arch.    See patient diagnoses and orders along with patient instructions for assessment, plan, and changes to care for patient.    This visit was conducted via telemedicine with live video and audio provided through Video Options: MyChart/Zoom at the point of care.    There are no Patient Instructions on file for this visit.    No follow-ups on file.    Ambulatory progress note signed and attested to by Aditya Collado D.O.

## 2022-12-01 ENCOUNTER — TELEPHONE (OUTPATIENT)
Dept: FAMILY MEDICINE CLINIC | Facility: CLINIC | Age: 47
End: 2022-12-01

## 2022-12-01 ENCOUNTER — PRIOR AUTHORIZATION (OUTPATIENT)
Dept: FAMILY MEDICINE CLINIC | Facility: CLINIC | Age: 47
End: 2022-12-01

## 2022-12-01 NOTE — TELEPHONE ENCOUNTER
Caller: Yony Parsons    Relationship to patient: Self    Best call back number: [phone]    RETURNED A CALL FROM [facility]

## 2022-12-01 NOTE — TELEPHONE ENCOUNTER
Caller: Yony Parsons    Relationship: Self    Best call back number: [phone]    What medications are you currently taking:   Current Outpatient Medications on File Prior to Visit   Medication Sig Dispense Refill   • albuterol sulfate  (90 Base) MCG/ACT inhaler Inhale 2 puffs Every 4 (Four) Hours As Needed for Wheezing or Shortness of Air. 8 g 1   • calcium carbonate (TUMS) 500 MG chewable tablet Chew 1 tablet As Needed.     • Dexilant 60 MG capsule Take 1 capsule by mouth Daily. 90 capsule 3   • diclofenac (VOLTAREN) 1 % gel gel apply 2 grams to affected area ON UPPER EXTREMITIES four times a ...  (REFER TO PRESCRIPTION NOTES).  0   • Dymista 137-50 MCG/ACT suspension 1 spray by Each Nare route Daily. 23 g 11   • Multiple Vitamins-Minerals (MENS MULTI VITAMIN & MINERAL PO) Take 1 tablet by mouth Every Morning.     • valACYclovir (VALTREX) 1000 MG tablet Take 1 tablet by mouth 3 (Three) Times a Day. 15 tablet 0     No current facility-administered medications on file prior to visit.          When did you start taking these medications:     Which medication are you concerned about: Dexilant 60 MG capsule    Who prescribed you this medication:     What are your concerns: PATIENT IS NEEDING A PRIOR AUTHORIZATION ON THIS MEDICATION BECAUSE HIS INSURANCE WILL NOT COVER IT      How long have you had these concerns:

## 2022-12-06 ENCOUNTER — PATIENT MESSAGE (OUTPATIENT)
Dept: FAMILY MEDICINE CLINIC | Facility: CLINIC | Age: 47
End: 2022-12-06

## 2022-12-07 ENCOUNTER — PRIOR AUTHORIZATION (OUTPATIENT)
Dept: FAMILY MEDICINE CLINIC | Facility: CLINIC | Age: 47
End: 2022-12-07

## 2022-12-07 NOTE — TELEPHONE ENCOUNTER
Key: [id number]  Med:Dexilant 60mg caps-BRAND  Status:sent to plan, awaiting determination  Created:12/7/2022

## 2022-12-07 NOTE — TELEPHONE ENCOUNTER
Deniedtoday  PA Case: [id number], Status: Partially Approved, Coverage Starts on: 12/7/2022 12:00:00 AM, Coverage Ends on: 12/7/2023 12:00:00 AM.

## 2022-12-12 DIAGNOSIS — R05.3 PERSISTENT COUGH FOR 3 WEEKS OR LONGER: ICD-10-CM

## 2022-12-12 RX ORDER — ALBUTEROL SULFATE 90 UG/1
AEROSOL, METERED RESPIRATORY (INHALATION)
Qty: 8.5 G | Refills: 0 | Status: SHIPPED | OUTPATIENT
Start: 2022-12-12 | End: 2022-12-27

## 2022-12-15 ENCOUNTER — OFFICE VISIT (OUTPATIENT)
Dept: FAMILY MEDICINE CLINIC | Facility: CLINIC | Age: 47
End: 2022-12-15

## 2022-12-15 VITALS
OXYGEN SATURATION: 95 % | BODY MASS INDEX: 28.62 KG/M2 | SYSTOLIC BLOOD PRESSURE: 110 MMHG | HEART RATE: 82 BPM | WEIGHT: 223 LBS | TEMPERATURE: 98.2 F | DIASTOLIC BLOOD PRESSURE: 78 MMHG

## 2022-12-15 DIAGNOSIS — J06.9 ACUTE URI: Primary | ICD-10-CM

## 2022-12-15 PROCEDURE — 99213 OFFICE O/P EST LOW 20 MIN: CPT | Performed by: FAMILY MEDICINE

## 2022-12-15 RX ORDER — BROMPHENIRAMINE MALEATE, PSEUDOEPHEDRINE HYDROCHLORIDE, AND DEXTROMETHORPHAN HYDROBROMIDE 2; 30; 10 MG/5ML; MG/5ML; MG/5ML
5 SYRUP ORAL 4 TIMES DAILY PRN
Qty: 118 ML | Refills: 1 | Status: SHIPPED | OUTPATIENT
Start: 2022-12-15 | End: 2022-12-22

## 2022-12-15 RX ORDER — AMOXICILLIN AND CLAVULANATE POTASSIUM 875; 125 MG/1; MG/1
1 TABLET, FILM COATED ORAL 2 TIMES DAILY
Qty: 14 TABLET | Refills: 0 | Status: SHIPPED | OUTPATIENT
Start: 2022-12-15 | End: 2022-12-22

## 2022-12-15 NOTE — PROGRESS NOTES
Established Patient Office Visit      Patient Name: Yony aPrsons  : 1975   MRN: [id number]   Care Team: Patient Care Team:  Aditya Collado DO as PCP - General (Family Medicine)    Chief Complaint:    Chief Complaint   Patient presents with   • Cough     Pt continued cough and chest congestion for approx 3 weeks       History of Present Illness: Yony Parsons is a 47 y.o. male who is here today for chief complaint.    Cough  This is a new problem. The current episode started 1 to 4 weeks ago. The problem has been gradually improving. The problem occurs every few minutes. The cough is productive of sputum. Associated symptoms include rhinorrhea. Pertinent negatives include no chest pain, chills, ear congestion, ear pain, fever, headaches, heartburn, hemoptysis, myalgias, nasal congestion, postnasal drip, rash, sore throat, shortness of breath, sweats, weight loss or wheezing. Nothing aggravates the symptoms.       The patient presents today for a follow-up on congestion and cough. His last visit was in 2022 via telehealth. He contracted COVID-19 approximately 10 weeks ago at this point, he initially got better, then traveled, and got another sore throat, which is when I saw him in 2022. At that time, I sent in an antibiotic as well as a decongestant and an albuterol inhaler.    The patient states that he had COVID-19 approximately 2 months ago. He reports that he travelled to [facility] and he was outside all day, drinking too much beer and wine, and not eating right. He states that his niece had a severe upper respiratory infection, and he tried to stay away from her. He reports that he felt the onset of sore throat on his return trip approximately 3 weeks ago. He states that he initially observed sinus symptoms and significant congestion. He mentions that he was blowing his nose constantly. He states that he is now experiencing symptoms in his chest and he is coughing up  phlegm. He notes that he does not like to take antibiotics. He reports that he uses the albuterol inhaler, and it has helped him with his current cough.    The patient reports that his knee and foot are doing well. He notes that he has not been exercising regularly because he has been sick. He states that he has orthotics for his medial arch pain, and that seemed to improve his symptoms.    The following portions of the patient's history were reviewed and updated as appropriate: allergies, current medications, past family history, past medical history, past social history, past surgical history and problem list.    Subjective      Review of Systems:   Review of Systems   Constitutional: Negative for chills, fever and unexpected weight loss.   HENT: Positive for rhinorrhea. Negative for ear pain, postnasal drip and sore throat.    Respiratory: Positive for cough. Negative for hemoptysis, shortness of breath and wheezing.    Cardiovascular: Negative for chest pain.   Musculoskeletal: Negative for myalgias.   Skin: Negative for rash.    - See HPI    Past Medical History:   Past Medical History:   Diagnosis Date   • Allergic     seasonal allergies   • Allergic rhinitis Lifelong    especially fall   • Anal fissure 2006   • Chronic gastric erosion 2014    EGD positive for gastritis and esophagitis   • Colon adenomas 2007    With rectal adenoma   • GERD (gastroesophageal reflux disease) 2006    Severe refractory   • IBS (irritable bowel syndrome) 2006   • Overactive bladder 2013   • Pelvic pain 2010    Negative urologic workup   • Prediabetes 2017    Hemoglobin A1c 6.2   • Shingles 05/2018    Right T10 and T12 dermatomes       Past Surgical History:   Past Surgical History:   Procedure Laterality Date   • APPENDECTOMY  1992   • CHOLECYSTECTOMY  2006   • COLONOSCOPY  2017    Normal study   • COLONOSCOPY W/ POLYPECTOMY  2007    With rectal adenoma   • KNEE ARTHROSCOPY Right 2016    ACL repair   • KNEE SURGERY Left 2008     ACL repair       Family History:   Family History   Problem Relation Age of Onset   • Diabetes type II Mother 60   • Prostate cancer Maternal Grandfather    • No Known Problems Father    • No Known Problems Sister    • No Known Problems Brother    • Lung cancer Paternal Grandfather    • Breast cancer Neg Hx    • Ovarian cancer Neg Hx        Social History:   Social History     Socioeconomic History   • Marital status:    Tobacco Use   • Smoking status: Former     Packs/day: 0.10     Years: 10.00     Pack years: 1.00     Types: Cigarettes     Start date: 1/1/1997     Quit date: 1/1/2007     Years since quitting: 15.9   • Smokeless tobacco: Never   Substance and Sexual Activity   • Alcohol use: Yes     Alcohol/week: 4.0 standard drinks     Types: 4 Glasses of wine per week     Comment: light social drinking   • Drug use: No   • Sexual activity: Yes     Partners: Female       Tobacco History:   Social History     Tobacco Use   Smoking Status Former   • Packs/day: 0.10   • Years: 10.00   • Pack years: 1.00   • Types: Cigarettes   • Start date: 1/1/1997   • Quit date: 1/1/2007   • Years since quitting: 15.9   Smokeless Tobacco Never       Medications:     Current Outpatient Medications:   •  albuterol sulfate  (90 Base) MCG/ACT inhaler, INHALE 2 PUFFS BY MOUTH EVERY 4 HOURS AS NEEDED FOR WHEEZING OR SHORTNESS OF AIR, Disp: 8.5 g, Rfl: 0  •  calcium carbonate (TUMS) 500 MG chewable tablet, Chew 1 tablet As Needed., Disp: , Rfl:   •  Dexilant 60 MG capsule, Take 1 capsule by mouth Daily., Disp: 90 capsule, Rfl: 3  •  diclofenac (VOLTAREN) 1 % gel gel, apply 2 grams to affected area ON UPPER EXTREMITIES four times a ...  (REFER TO PRESCRIPTION NOTES)., Disp: , Rfl: 0  •  Dymista 137-50 MCG/ACT suspension, 1 spray by Each Nare route Daily., Disp: 23 g, Rfl: 11  •  Multiple Vitamins-Minerals (MENS MULTI VITAMIN & MINERAL PO), Take 1 tablet by mouth Every Morning., Disp: , Rfl:   •  amoxicillin-clavulanate  (Augmentin) 875-125 MG per tablet, Take 1 tablet by mouth 2 (Two) Times a Day for 7 days., Disp: 14 tablet, Rfl: 0  •  brompheniramine-pseudoephedrine-DM 30-2-10 MG/5ML syrup, Take 5 mL by mouth 4 (Four) Times a Day As Needed for Allergies (mucous) for up to 7 days., Disp: 118 mL, Rfl: 1  •  valACYclovir (VALTREX) 1000 MG tablet, Take 1 tablet by mouth 3 (Three) Times a Day., Disp: 15 tablet, Rfl: 0    Allergies:   Allergies   Allergen Reactions   • No Known Drug Allergy        Objective   Objective     Physical Exam:  Vital Signs:   Vitals:    12/15/22 1509   BP: 110/78   BP Location: Left arm   Patient Position: Sitting   Cuff Size: Adult   Pulse: 82   Temp: 98.2 °F (36.8 °C)   TempSrc: Infrared   SpO2: 95%   Weight: 101 kg (223 lb)     Body mass index is 28.62 kg/m².     Physical Exam     Const: NAD, A & Ox4, Pleasant, Cooperative  Eyes: EOMI, no conjunctivitis  ENT: No nasal discharge present, neck supple  Cardiac: Regular rate and rhythm, no cyanosis  Resp: Respiratory rate within normal limits, no increased work of breathing, no audible wheezing or retractions noted  GI: No distention or ascites  MSK: Motor and sensation grossly intact in bilateral upper extremities  Neurologic: CN II-XII grossly intact  Psych: Appropriate mood and behavior.  Skin: Warm, dry    Procedures/Radiology     Procedures  No radiology results for the last 7 days     Assessment & Plan   Assessment / Plan      Assessment/Plan:   Problems Addressed This Visit  Diagnoses and all orders for this visit:    1. Acute URI (Primary)  -     amoxicillin-clavulanate (Augmentin) 875-125 MG per tablet; Take 1 tablet by mouth 2 (Two) Times a Day for 7 days.  Dispense: 14 tablet; Refill: 0  -     brompheniramine-pseudoephedrine-DM 30-2-10 MG/5ML syrup; Take 5 mL by mouth 4 (Four) Times a Day As Needed for Allergies (mucous) for up to 7 days.  Dispense: 118 mL; Refill: 1      Problem List Items Addressed This Visit    None  Visit Diagnoses     Acute  URI    -  Primary    Relevant Medications    amoxicillin-clavulanate (Augmentin) 875-125 MG per tablet    brompheniramine-pseudoephedrine-DM 30-2-10 MG/5ML syrup          1. Persistent congestion and cough  - I will prescribe Augmentin and send a refill of the decongestant.    There are no Patient Instructions on file for this visit.    Follow Up:   Return if symptoms worsen or fail to improve.    DO JOSE JUAN Ortiz RD  [facility]  [address]  [facility] [address]  Fax [phone]  Phone [phone]       Transcribed from ambient dictation for Aditya Collado DO by Roslyn Mullen.  12/15/22   17:43 EST    Patient or patient representative verbalized consent to the visit recording.  I have personally performed the services described in this document as transcribed by the above individual, and it is both accurate and complete.

## 2022-12-25 DIAGNOSIS — R05.3 PERSISTENT COUGH FOR 3 WEEKS OR LONGER: ICD-10-CM

## 2022-12-27 RX ORDER — ALBUTEROL SULFATE 90 UG/1
AEROSOL, METERED RESPIRATORY (INHALATION)
Qty: 8.5 G | Refills: 0 | Status: SHIPPED | OUTPATIENT
Start: 2022-12-27 | End: 2023-01-09

## 2023-01-09 DIAGNOSIS — R05.3 PERSISTENT COUGH FOR 3 WEEKS OR LONGER: ICD-10-CM

## 2023-01-09 RX ORDER — ALBUTEROL SULFATE 90 UG/1
AEROSOL, METERED RESPIRATORY (INHALATION)
Qty: 8.5 G | Refills: 0 | Status: SHIPPED | OUTPATIENT
Start: 2023-01-09

## 2023-01-31 ENCOUNTER — PRIOR AUTHORIZATION (OUTPATIENT)
Dept: FAMILY MEDICINE CLINIC | Facility: CLINIC | Age: 48
End: 2023-01-31
Payer: COMMERCIAL

## 2023-01-31 NOTE — TELEPHONE ENCOUNTER
(Key: [id number])  Med:Dymista 137-50MCG/ACT suspension  Status:Available without authorization.  Created:01/31/2023

## 2023-02-03 ENCOUNTER — OFFICE VISIT (OUTPATIENT)
Dept: FAMILY MEDICINE CLINIC | Facility: CLINIC | Age: 48
End: 2023-02-03
Payer: COMMERCIAL

## 2023-02-03 ENCOUNTER — HOSPITAL ENCOUNTER (OUTPATIENT)
Dept: GENERAL RADIOLOGY | Facility: HOSPITAL | Age: 48
Discharge: HOME | End: 2023-02-03
Admitting: STUDENT IN AN ORGANIZED HEALTH CARE EDUCATION/TRAINING PROGRAM
Payer: COMMERCIAL

## 2023-02-03 VITALS
DIASTOLIC BLOOD PRESSURE: 88 MMHG | WEIGHT: 226.2 LBS | HEART RATE: 85 BPM | BODY MASS INDEX: 29.03 KG/M2 | OXYGEN SATURATION: 98 % | HEIGHT: 74 IN | SYSTOLIC BLOOD PRESSURE: 140 MMHG

## 2023-02-03 DIAGNOSIS — R05.3 CHRONIC COUGH: ICD-10-CM

## 2023-02-03 DIAGNOSIS — R05.3 CHRONIC COUGH: Primary | ICD-10-CM

## 2023-02-03 PROCEDURE — 71046 X-RAY EXAM CHEST 2 VIEWS: CPT

## 2023-02-03 PROCEDURE — 99213 OFFICE O/P EST LOW 20 MIN: CPT | Performed by: STUDENT IN AN ORGANIZED HEALTH CARE EDUCATION/TRAINING PROGRAM

## 2023-02-03 RX ORDER — FLUTICASONE PROPIONATE 110 UG/1
1 AEROSOL, METERED RESPIRATORY (INHALATION)
Qty: 12 G | Refills: 0 | Status: SHIPPED | OUTPATIENT
Start: 2023-02-03 | End: 2023-03-02

## 2023-02-03 RX ORDER — BENZONATATE 100 MG/1
100 CAPSULE ORAL 3 TIMES DAILY PRN
Qty: 30 CAPSULE | Refills: 0 | Status: SHIPPED | OUTPATIENT
Start: 2023-02-03

## 2023-02-03 NOTE — PROGRESS NOTES
Established Office Visit      Patient Name: Yony Parsons  : 1975   MRN: [id number]   Care Team: Patient Care Team:  Aditya Collado DO as PCP - General (Family Medicine)    Chief Complaint:    Chief Complaint   Patient presents with   • Cough     A persistent cough which has been improving but just won't completely go away. It is worse at night and also right when he wake up in the morning. The abuterol inhaler really helps clear things out.       History of Present Illness: Yony Parsons is a 48 y.o. male with allergies/rhinitis, GERD who is here today to discuss chronic cough    Reports cough ongoing for 4 months, intermittently productive. Symptoms initially started after recovering from covid 4 months ago, he then had second URI a few weeks later. Cough never resolved although it is gradually improving. Still lingering, bothersome at nighttime and occasionally throughout the day. Albuterol inhaler is helpful.   No fever, wheezing, congestion, headaches, hemoptysis, body aches.     He was seen for similar cough about 2 months ago and prescribed course of augmentin and decongestant. Prior to this he has taken azithromycin and medrol dose pack. Wants to avoid more abx and systemic steroids.    He has no history of asthma. He does not smoke cigarettes.     Subjective      Review of Systems:   Review of Systems - See HPI    I have reviewed and the following portions of the patient's history were updated as appropriate: past family history, past medical history, past social history, past surgical history and problem list.    Medications:     Current Outpatient Medications:   •  albuterol sulfate  (90 Base) MCG/ACT inhaler, INHALE 2 PUFFS BY MOUTH EVERY 4 HOURS AS NEEDED FOR WHEEZING OR SHORTNESS OF BREATH, Disp: 8.5 g, Rfl: 0  •  calcium carbonate (TUMS) 500 MG chewable tablet, Chew 1 tablet As Needed., Disp: , Rfl:   •  Dexilant 60 MG capsule, Take 1 capsule by mouth Daily., Disp:  "90 capsule, Rfl: 3  •  diclofenac (VOLTAREN) 1 % gel gel, apply 2 grams to affected area ON UPPER EXTREMITIES four times a ...  (REFER TO PRESCRIPTION NOTES)., Disp: , Rfl: 0  •  Dymista 137-50 MCG/ACT suspension, 1 spray by Each Nare route Daily., Disp: 23 g, Rfl: 11  •  Multiple Vitamins-Minerals (MENS MULTI VITAMIN & MINERAL PO), Take 1 tablet by mouth Every Morning., Disp: , Rfl:   •  valACYclovir (VALTREX) 1000 MG tablet, Take 1 tablet by mouth 3 (Three) Times a Day., Disp: 15 tablet, Rfl: 0  •  benzonatate (Tessalon Perles) 100 MG capsule, Take 1 capsule by mouth 3 (Three) Times a Day As Needed for Cough., Disp: 30 capsule, Rfl: 0  •  fluticasone (Flovent HFA) 110 MCG/ACT inhaler, Inhale 1 puff 2 (Two) Times a Day., Disp: 12 g, Rfl: 0    Allergies:   Allergies   Allergen Reactions   • No Known Drug Allergy        Objective     Physical Exam:  Vital Signs:   Vitals:    02/03/23 1249   BP: 140/88   Pulse: 85   SpO2: 98%   Weight: 103 kg (226 lb 3.2 oz)   Height: 188 cm (74.02\")     Body mass index is 29.03 kg/m².     Physical Exam  Vitals reviewed.   Constitutional:       Appearance: Normal appearance. He is not ill-appearing.   Cardiovascular:      Rate and Rhythm: Normal rate.      Pulses: Normal pulses.   Pulmonary:      Effort: Pulmonary effort is normal. No respiratory distress.      Breath sounds: Normal breath sounds. No stridor. No wheezing, rhonchi or rales.   Skin:     General: Skin is warm and dry.   Neurological:      Mental Status: He is alert.   Psychiatric:         Mood and Affect: Mood normal.         Behavior: Behavior normal.         Judgment: Judgment normal.         Assessment / Plan      Assessment/Plan:   Problems Addressed This Visit  Diagnoses and all orders for this visit:    1. Chronic cough (Primary)  -     XR Chest PA & Lateral; Future  -     benzonatate (Tessalon Perles) 100 MG capsule; Take 1 capsule by mouth 3 (Three) Times a Day As Needed for Cough.  Dispense: 30 capsule; Refill: " 0  -     fluticasone (Flovent HFA) 110 MCG/ACT inhaler; Inhale 1 puff 2 (Two) Times a Day.  Dispense: 12 g; Refill: 0        Discussed common causes of noninfectious chronic cough including cough variant asthma, upper airway cough syndrome, GERD, reactive airway disease, post viral cough. He does not have signs/symptoms of acute infectious cough but we will obtain CXR to rule out atypical infection/inflammation/acute pulmonary process. He has had partial relief with albuterol inhaler. Would like to avoid further courses of antibiotics per patient preference and also do not feel this would be beneficial. Will try tessalon perles TID prn along with trial of ICS inhaler. Discussed importance of optimizing allergies to avoid PND. He does not have congestion/rhinitis present today.     He will let us know if symptoms persist/do not improve.     Plan of care reviewed with patient at the conclusion of today's visit. Education was provided regarding diagnosis and management.  Patient verbalizes understanding of and agreement with management plan.    Follow Up:   No follow-ups on file.        DO JOSE JUAN Rodriguez RD  [facility]  [address]  [facility] [address]  Fax [phone]  Phone [phone]

## 2023-02-20 ENCOUNTER — TELEPHONE (OUTPATIENT)
Dept: FAMILY MEDICINE CLINIC | Facility: CLINIC | Age: 48
End: 2023-02-20
Payer: COMMERCIAL

## 2023-02-20 ENCOUNTER — PRIOR AUTHORIZATION (OUTPATIENT)
Dept: FAMILY MEDICINE CLINIC | Facility: CLINIC | Age: 48
End: 2023-02-20
Payer: COMMERCIAL

## 2023-02-20 NOTE — TELEPHONE ENCOUNTER
Attempted to contact, no answer left message asking for return call    His insurance will not pay for brand Dexilant, if he can provided adverse affects that he experiencing while taking the brand name we can resubmit this to his insurance     Hub can relay and document.

## 2023-02-20 NOTE — TELEPHONE ENCOUNTER
Caller: Yony Parsons    Relationship: Self    Best call back number: [phone]    What medications are you currently taking:   Current Outpatient Medications on File Prior to Visit   Medication Sig Dispense Refill   • albuterol sulfate  (90 Base) MCG/ACT inhaler INHALE 2 PUFFS BY MOUTH EVERY 4 HOURS AS NEEDED FOR WHEEZING OR SHORTNESS OF BREATH 8.5 g 0   • benzonatate (Tessalon Perles) 100 MG capsule Take 1 capsule by mouth 3 (Three) Times a Day As Needed for Cough. 30 capsule 0   • calcium carbonate (TUMS) 500 MG chewable tablet Chew 1 tablet As Needed.     • Dexilant 60 MG capsule Take 1 capsule by mouth Daily. 90 capsule 3   • diclofenac (VOLTAREN) 1 % gel gel apply 2 grams to affected area ON UPPER EXTREMITIES four times a ...  (REFER TO PRESCRIPTION NOTES).  0   • Dymista 137-50 MCG/ACT suspension 1 spray by Each Nare route Daily. 23 g 11   • fluticasone (Flovent HFA) 110 MCG/ACT inhaler Inhale 1 puff 2 (Two) Times a Day. 12 g 0   • Multiple Vitamins-Minerals (MENS MULTI VITAMIN & MINERAL PO) Take 1 tablet by mouth Every Morning.     • valACYclovir (VALTREX) 1000 MG tablet Take 1 tablet by mouth 3 (Three) Times a Day. 15 tablet 0     No current facility-administered medications on file prior to visit.              Which medication are you concerned about: Dexilant 60 MG capsule    Who prescribed you this medication: DR DONALDO PRICE     What are your concerns:  PATIENT COULDN'T TAKE THE GENERIC OF THIS MEDICATION, NOT AS EFFECTIVE, AND HE ADVISED HE NEEDS THE BRAND NAME AND IT WILL NEED AN AUTHORIZATION    How long have you had these concerns: PATIENT HAS TRIED THE GENERIC FOR THE PAST 2 MTHS AS WELL AS OTHER MEDICATIONS THROUGH THE PAST YEAR

## 2023-02-20 NOTE — TELEPHONE ENCOUNTER
PATIENT RETURNED THE CALL FROM [facility]. HUB RELAYED MESSAGE. PATIENT STATES THAT HE HAS TRIED THE GENERIC VERSION OF THIS MEDICATION FOR A FEW MONTHS AND HE STATES THAT IT HAS CAUSED STOMACH PAINS AND WAS INEFFECTIVE FOR THE TREATMENT OF HIS SYMPTOMS.    PLEASE ADVISE FURTHER WHEN REQUIRED.

## 2023-02-27 DIAGNOSIS — K21.9 GASTROESOPHAGEAL REFLUX DISEASE WITHOUT ESOPHAGITIS: ICD-10-CM

## 2023-02-27 RX ORDER — DEXLANSOPRAZOLE 60 MG/1
CAPSULE, DELAYED RELEASE ORAL
Qty: 90 CAPSULE | Refills: 3 | Status: SHIPPED | OUTPATIENT
Start: 2023-02-27

## 2023-02-28 ENCOUNTER — PRIOR AUTHORIZATION (OUTPATIENT)
Dept: FAMILY MEDICINE CLINIC | Facility: CLINIC | Age: 48
End: 2023-02-28
Payer: COMMERCIAL

## 2023-02-28 ENCOUNTER — OFFICE VISIT (OUTPATIENT)
Dept: FAMILY MEDICINE CLINIC | Facility: CLINIC | Age: 48
End: 2023-02-28
Payer: COMMERCIAL

## 2023-02-28 ENCOUNTER — TELEPHONE (OUTPATIENT)
Dept: FAMILY MEDICINE CLINIC | Facility: CLINIC | Age: 48
End: 2023-02-28
Payer: COMMERCIAL

## 2023-02-28 ENCOUNTER — PATIENT MESSAGE (OUTPATIENT)
Dept: FAMILY MEDICINE CLINIC | Facility: CLINIC | Age: 48
End: 2023-02-28

## 2023-02-28 VITALS
SYSTOLIC BLOOD PRESSURE: 110 MMHG | BODY MASS INDEX: 29 KG/M2 | HEART RATE: 74 BPM | HEIGHT: 74 IN | WEIGHT: 226 LBS | OXYGEN SATURATION: 97 % | DIASTOLIC BLOOD PRESSURE: 80 MMHG

## 2023-02-28 DIAGNOSIS — H60.331 ACUTE SWIMMER'S EAR OF RIGHT SIDE: Primary | ICD-10-CM

## 2023-02-28 PROCEDURE — 99213 OFFICE O/P EST LOW 20 MIN: CPT | Performed by: PHYSICIAN ASSISTANT

## 2023-02-28 RX ORDER — CIPROFLOXACIN/HYDROCORTISONE 0.2 %-1 %
3 SUSPENSION, DROPS(FINAL DOSAGE FORM)(ML) OTIC (EAR) 2 TIMES DAILY
Qty: 10 ML | Refills: 0 | Status: SHIPPED | OUTPATIENT
Start: 2023-02-28 | End: 2023-03-05

## 2023-02-28 RX ORDER — CIPROFLOXACIN/HYDROCORTISONE 0.2 %-1 %
3 SUSPENSION, DROPS(FINAL DOSAGE FORM)(ML) OTIC (EAR) 2 TIMES DAILY
COMMUNITY
End: 2023-02-28 | Stop reason: SDUPTHER

## 2023-02-28 NOTE — TELEPHONE ENCOUNTER
Caller: Yony Parsons    Relationship to patient: Self    Best call back number: [phone]    Patient is needing: PATIENT RETURNING CALL TO [facility]. PATIENT HAS QUESTIONS

## 2023-02-28 NOTE — PROGRESS NOTES
Chief Complaint   Patient presents with   • Otitis Media     Right ear was in the pool and hot tub too much. Used ciproHC otic BID would like to try again seems to help         Yony Mason Parsosn is a 48 y.o. male who presents for Otitis Media (Right ear was in the pool and hot tub too much. Used ciproHC otic BID would like to try again seems to help)    Patient recently in hot tub and developed right ear drainage, itching and pain.  Has history of recurrent otitis externa that is treated well with ciprofloxacin drops.  No other complaints today.    Past Medical History:   Diagnosis Date   • Allergic     seasonal allergies   • Allergic rhinitis Lifelong    especially fall   • Anal fissure 2006   • Chronic gastric erosion 2014    EGD positive for gastritis and esophagitis   • Colon adenomas 2007    With rectal adenoma   • GERD (gastroesophageal reflux disease) 2006    Severe refractory   • IBS (irritable bowel syndrome) 2006   • Overactive bladder 2013   • Pelvic pain 2010    Negative urologic workup   • Prediabetes 2017    Hemoglobin A1c 6.2   • Shingles 05/2018    Right T10 and T12 dermatomes       Past Surgical History:   Procedure Laterality Date   • APPENDECTOMY  1992   • CHOLECYSTECTOMY  2006   • COLONOSCOPY  2017    Normal study   • COLONOSCOPY W/ POLYPECTOMY  2007    With rectal adenoma   • KNEE ARTHROSCOPY Right 2016    ACL repair   • KNEE SURGERY Left 2008    ACL repair       Family History   Problem Relation Age of Onset   • Diabetes type II Mother 60   • Prostate cancer Maternal Grandfather    • No Known Problems Father    • No Known Problems Sister    • No Known Problems Brother    • Lung cancer Paternal Grandfather    • Breast cancer Neg Hx    • Ovarian cancer Neg Hx        Social History     Socioeconomic History   • Marital status:    Tobacco Use   • Smoking status: Former     Packs/day: 0.10     Years: 10.00     Pack years: 1.00     Types: Cigarettes     Start date: 1/1/1997     Quit date:  "2007     Years since quittin.1   • Smokeless tobacco: Never   Substance and Sexual Activity   • Alcohol use: Yes     Alcohol/week: 4.0 standard drinks     Types: 4 Glasses of wine per week     Comment: light social drinking   • Drug use: No   • Sexual activity: Yes     Partners: Female       Allergies   Allergen Reactions   • No Known Drug Allergy        ROS    Review of Systems   Constitutional: Negative for chills and fever.   HENT: Positive for ear discharge and ear pain.        Vitals:    23 0918   BP: 110/80   Pulse: 74   SpO2: 97%   Weight: 103 kg (226 lb)   Height: 188 cm (74.02\")     Body mass index is 29 kg/m².    Current Outpatient Medications on File Prior to Visit   Medication Sig Dispense Refill   • albuterol sulfate  (90 Base) MCG/ACT inhaler INHALE 2 PUFFS BY MOUTH EVERY 4 HOURS AS NEEDED FOR WHEEZING OR SHORTNESS OF BREATH 8.5 g 0   • calcium carbonate (TUMS) 500 MG chewable tablet Chew 1 tablet As Needed.     • dexlansoprazole (DEXILANT) 60 MG capsule TAKE 1 CAPSULE BY MOUTH DAILY 90 capsule 3   • diclofenac (VOLTAREN) 1 % gel gel apply 2 grams to affected area ON UPPER EXTREMITIES four times a ...  (REFER TO PRESCRIPTION NOTES).  0   • Dymista 137-50 MCG/ACT suspension 1 spray by Each Nare route Daily. 23 g 11   • fluticasone (Flovent HFA) 110 MCG/ACT inhaler Inhale 1 puff 2 (Two) Times a Day. 12 g 0   • Multiple Vitamins-Minerals (MENS MULTI VITAMIN & MINERAL PO) Take 1 tablet by mouth Every Morning.     • [DISCONTINUED] ciprofloxacin-hydrocortisone (Cipro HC) 0.2-1 % otic suspension Administer 3 drops to the right ear 2 (Two) Times a Day.     • benzonatate (Tessalon Perles) 100 MG capsule Take 1 capsule by mouth 3 (Three) Times a Day As Needed for Cough. 30 capsule 0   • [DISCONTINUED] valACYclovir (VALTREX) 1000 MG tablet Take 1 tablet by mouth 3 (Three) Times a Day. 15 tablet 0     No current facility-administered medications on file prior to visit.       Results for " orders placed or performed in visit on 06/28/22   CBC (No Diff)    Specimen: Blood   Result Value Ref Range    WBC 6.41 3.40 - 10.80 10*3/mm3    RBC 5.00 4.14 - 5.80 10*6/mm3    Hemoglobin 15.5 13.0 - 17.7 g/dL    Hematocrit 45.9 37.5 - 51.0 %    MCV 91.8 79.0 - 97.0 fL    MCH 31.0 26.6 - 33.0 pg    MCHC 33.8 31.5 - 35.7 g/dL    RDW 12.9 12.3 - 15.4 %    RDW-SD 43.3 37.0 - 54.0 fl    MPV 10.4 6.0 - 12.0 fL    Platelets 218 140 - 450 10*3/mm3   Lipid Panel    Specimen: Blood   Result Value Ref Range    Total Cholesterol 167 0 - 200 mg/dL    Triglycerides 84 0 - 150 mg/dL    HDL Cholesterol 54 40 - 60 mg/dL    LDL Cholesterol  97 0 - 100 mg/dL    VLDL Cholesterol 16 5 - 40 mg/dL    LDL/HDL Ratio 1.78    Hemoglobin A1c    Specimen: Blood   Result Value Ref Range    Hemoglobin A1C 5.70 (H) 4.80 - 5.60 %   Comprehensive Metabolic Panel    Specimen: Blood   Result Value Ref Range    Glucose 105 (H) 65 - 99 mg/dL    BUN 14 6 - 20 mg/dL    Creatinine 1.15 0.76 - 1.27 mg/dL    Sodium 139 136 - 145 mmol/L    Potassium 4.8 3.5 - 5.2 mmol/L    Chloride 102 98 - 107 mmol/L    CO2 26.3 22.0 - 29.0 mmol/L    Calcium 9.4 8.6 - 10.5 mg/dL    Total Protein 6.9 6.0 - 8.5 g/dL    Albumin 4.50 3.50 - 5.20 g/dL    ALT (SGPT) 39 1 - 41 U/L    AST (SGOT) 32 1 - 40 U/L    Alkaline Phosphatase 52 39 - 117 U/L    Total Bilirubin 0.5 0.0 - 1.2 mg/dL    Globulin 2.4 gm/dL    A/G Ratio 1.9 g/dL    BUN/Creatinine Ratio 12.2 7.0 - 25.0    Anion Gap 10.7 5.0 - 15.0 mmol/L    eGFR 78.5 >60.0 mL/min/1.73   TSH    Specimen: Blood   Result Value Ref Range    TSH 1.880 0.270 - 4.200 uIU/mL   Testosterone    Specimen: Blood   Result Value Ref Range    Testosterone, Total 344.00 249.00 - 836.00 ng/dL   Urinalysis With Microscopic If Indicated (No Culture) - Urine, Clean Catch    Specimen: Urine, Clean Catch   Result Value Ref Range    Color, UA Yellow Yellow, Straw    Appearance, UA Clear Clear    pH, UA 7.0 5.0 - 8.0    Specific Gravity, UA 1.008 1.005 -  1.030    Glucose, UA Negative Negative    Ketones, UA Negative Negative    Bilirubin, UA Negative Negative    Blood, UA Negative Negative    Protein, UA Negative Negative    Leuk Esterase, UA Negative Negative    Nitrite, UA Negative Negative    Urobilinogen, UA 0.2 E.U./dL 0.2 - 1.0 E.U./dL       PE    Physical Exam  Vitals reviewed.   Constitutional:       General: He is not in acute distress.     Appearance: Normal appearance. He is well-developed. He is not ill-appearing or diaphoretic.   HENT:      Head: Normocephalic and atraumatic.      Right Ear: Hearing and external ear normal. Drainage present.      Left Ear: Hearing, tympanic membrane, ear canal and external ear normal.   Eyes:      Extraocular Movements: Extraocular movements intact.      Conjunctiva/sclera: Conjunctivae normal.   Pulmonary:      Effort: No respiratory distress.   Musculoskeletal:         General: Normal range of motion.      Cervical back: Normal range of motion.   Neurological:      General: No focal deficit present.      Mental Status: He is alert.   Psychiatric:         Attention and Perception: He is attentive.         Mood and Affect: Mood normal.         Speech: Speech normal.         Behavior: Behavior normal. Behavior is cooperative.         Thought Content: Thought content normal.         Judgment: Judgment normal.          A/P    Diagnoses and all orders for this visit:    1. Acute swimmer's ear of right side (Primary)  -     ciprofloxacin-hydrocortisone (Cipro HC) 0.2-1 % otic suspension; Administer 3 drops to the right ear 2 (Two) Times a Day for 5 days.  Dispense: 10 mL; Refill: 0         Plan of care reviewed with patient at the conclusion of today's visit. Education was provided regarding diagnosis, management and any prescribed or recommended OTC medications.  Patient verbalizes understanding of and agreement with management plan.    Dictated Utilizing Dragon Dictation     Please note that portions of this note were  completed with a voice recognition program.     Part of this note may be an electronic transcription/translation of spoken language to printed text using the Dragon Dictation System.    No follow-ups on file.     Mary George PA-C

## 2023-02-28 NOTE — TELEPHONE ENCOUNTER
Caller: oYny Parsons    Relationship: Self    Best call back number: [phone]    What medication are you requesting: CIPRO-DEX GENERIC    What are your current symptoms: EAR PAIN    Have you had these symptoms before:    [x] Yes  [] No    Have you been treated for these symptoms before:   [x] Yes  [] No    If a prescription is needed, what is your preferred pharmacy and phone number: [id number] [place] [id number] AT [facility] & [address] [facility] [phone] [facility] [phone] FX     Additional notes:  INSURANCE WILL NOT COVER THE PREVIOUSLY PRESCRIBED MEDICATION.

## 2023-03-02 DIAGNOSIS — R05.3 CHRONIC COUGH: ICD-10-CM

## 2023-03-02 RX ORDER — DEXAMETHASONE 4 MG/1
TABLET ORAL
Qty: 12 G | Refills: 0 | Status: SHIPPED | OUTPATIENT
Start: 2023-03-02

## 2023-03-02 NOTE — TELEPHONE ENCOUNTER
Patient Name: Jolene Gomes  MRN:[id number]  :1947    Referring Physician:   Peri Ramon DO  Fax: [phone]      Chief Complaint   Patient presents with   • Office Visit     8 months follow up, patient states she is feeling fine today denies chest pain or other symptoms        HISTORY OF PRESENT ILLNESS:    Jolene Gomes is a 74 year old female presented office for a follow-up visit.    She moved from [place] to [place] in summer 2019. She used to follow Dr. Murphy, cardiologist and Dr. Morin, electrophysiologist at [facility].     Her cardiac history is significant for shortness of breath and acute systolic heart failure diagnosed in 2017, found to have severe mitral regurgitation with LVEF of 35%.  She underwent mitral valve repair with 30 mm physio-annuloplasty ring and closure of PFO. .  In 2019, she was diagnosed with atrial flutter with variable heart block. She was seen by Dr. Rogel and then later underwent atrial flutter ablation in 2019 successfully.  Her last echocardiogram in 2020 showed LVEF of 45% with mild regurgitation of aortic valve and normal left functioning mitral valve repair well.    In recent past he had a parvovirus induced anemia which is improving.  She follows up with Dr. Jose.    She has been off anticoagulation due to severe anemia.    At present she feels well.  Her last echocardiogram in 2020 showed LVEF is 45%.    She is clinically euvolemic and denies any symptoms.  Her blood pressure is stable in the office.  She is tolerating Coreg 3.125 mg twice a day.    She monitors her rhythm on [employer] regularly.  She denies any recurrent of atrial fibrillation.     From cardiac perspective, she denies any chest pain, shortness of breath, nausea, vomiting, sweating.  She denies any recent palpitation, dizziness, lightheadedness, fall or loss of consciousness.    Patient takes her medications on regular  Rx Refill Note  Requested Prescriptions     Pending Prescriptions Disp Refills   • Flovent  MCG/ACT inhaler [Pharmacy Med Name: FLOVENT HFA 110MCG ORAL INH 120INH] 12 g 0     Sig: INHALE 1 PUFF BY MOUTH TWICE DAILY      Last office visit with prescribing clinician: 2/3/2023   Last telemedicine visit with prescribing clinician: 6/28/2023   Next office visit with prescribing clinician: Visit date not found                         Would you like a call back once the refill request has been completed: [] Yes [] No    If the office needs to give you a call back, can they leave a voicemail: [] Yes [] No    Hazel Barbosa MA  03/02/23, 08:06 EST   basis.      Past Medical History:   Diagnosis Date   • Atrial flutter (CMS/HCC) 9/16/2019   • CHF (congestive heart failure) (CMS/HCC)    • Essential hypertension, benign    • High cholesterol    • Hyperlipidemia    • S/P mitral valve repair 1/23/2017   • S/P MVR (mitral valve repair) 1/23/2017       Past Surgical History:   Procedure Laterality Date   • Biopsy of breast, incisional  11/20/00    Left Breast Biopsy.  Benign fibroadenoma.  Dr. Najera.  [facility]   • Cardiac catherization  01/20/2017    Pre-op: 3+ MR into dilated LA. No CAD. EF 35%. LVEDP 16 mmHg. PA 60/34/38. PCW 28/41/22. CO 3.57 l/m   • Colonoscopy remove lesions by snare  10/6/11    Dr. Hoyos, Diverticulosis/Polyps, F/U 7 years   • Foot/toes surgery proc unlisted  1/93    Closed reduction and percutaneous pinning of MTP joint, right third toe.  Dr. Modi.  [facility]   • Insert intrauterine device  6-10-89    Copper-7 IUD removed and ParaGard IUD inserted. This was her fouth IUD.   Dr. Werner.   • Laparoscopy,tubal cautery      Tubal Ligation   • Ligatn/strip long or short saphen      Vein Stripping right leg.   • Total abdom hysterectomy  11/17/98    ALAYNA w BSO for leiomyomata uterus, pelvic adhesions, indwelling IUD.  Dr. Werner.  [facility]       ALLERGIES:  No Known Allergies    Current Outpatient Medications   Medication Sig Dispense Refill   • losartan (COZAAR) 25 MG tablet Take one-half tablet by mouth daily. 30 tablet 0   • simvastatin (ZOCOR) 20 MG tablet TAKE ONE TABLET BY MOUTH ONE TIME DAILY 90 tablet 3   • carvedilol (COREG) 3.125 MG tablet TAKE 1 TABLET BY MOUTH 2 TIMES DAILY (WITH MEALS). 180 tablet 0   • Multiple Vitamins-Minerals (MULTIVITAMIN ADULT PO) Take by mouth daily.     • aspirin (ASPIRIN 81) 81 MG EC tablet Take 81 mg by mouth daily.     • fluticasone (FLONASE) 50 MCG/ACT nasal spray Spray 1 spray in each nostril daily as needed.  16 g 12     No current facility-administered medications for this visit.       Social History      Tobacco Use   • Smoking status: Never Smoker   • Smokeless tobacco: Never Used   Vaping Use   • Vaping Use: never used   Substance Use Topics   • Alcohol use: Yes     Alcohol/week: 0.0 - 3.0 standard drinks     Comment: occasionally   • Drug use: No       Family History   Problem Relation Age of Onset   • Hypertension Mother    • Heart Mother         possibly from a valve   • Patient is unaware of any medical problems Father    • Hypertension Brother    • Cancer, Colon Neg Hx    • Cancer, Prostate Neg Hx    • Bilateral breast cancer Neg Hx    • Cancer, Ovarian Neg Hx        Review of Systems   Constitutional: Positive for malaise/fatigue.   HENT: Negative.    Eyes: Negative.    Cardiovascular:        AS PER HPI   Respiratory:        AS PER HPI   Endocrine: Negative.    Hematologic/Lymphatic: Negative.    Skin: Negative.    Musculoskeletal: Negative.    Gastrointestinal: Negative.    Neurological: Negative.    Psychiatric/Behavioral: Negative.    Allergic/Immunologic: Negative.        Vitals:    02/23/22 1105 02/23/22 1109   BP: (!) 140/84 134/80   BP Location: LUE - Left upper extremity LUE - Left upper extremity   Patient Position: Sitting Standing   Pulse: 84    Resp: 16    Temp: 96.4 °F (35.8 °C)    SpO2: 100%    Weight: 71.6 kg (157 lb 15.4 oz)    Height: 5' 6\" (1.676 m)    PainSc:  0        Physical Exam  Constitutional:       General: She is not in acute distress.     Appearance: She is well-developed. She is not diaphoretic.   HENT:      Head: Atraumatic.      Nose: Nose normal.   Eyes:      General: No scleral icterus.     Conjunctiva/sclera: Conjunctivae normal.   Neck:      Vascular: No JVD.   Cardiovascular:      Rate and Rhythm: Normal rate and regular rhythm.      Pulses:           Carotid pulses are 2+ on the right side and 2+ on the left side.       Radial pulses are 2+ on the right side and 2+ on the left side.        Dorsalis pedis pulses are 1+ on the right side and 1+ on the left side.       Heart sounds: S1 normal and S2 normal. Murmur heard.    Systolic murmur is present with a grade of 2/6.    No friction rub. No gallop.   Pulmonary:      Effort: Pulmonary effort is normal.      Breath sounds: Normal breath sounds.   Abdominal:      General: Bowel sounds are normal.      Palpations: Abdomen is soft.   Musculoskeletal:         General: No tenderness.      Cervical back: Neck supple.   Skin:     General: Skin is warm.   Neurological:      Mental Status: She is alert and oriented to person, place, and time.      Deep Tendon Reflexes: Reflexes are normal and symmetric.   Psychiatric:         Behavior: Behavior normal.           Lab Results   Component Value Date/Time    WBC 5.4 11/10/2021 08:09 AM    WBC 11.2 (H) 11/07/2020 09:09 AM    RBC 4.86 11/10/2021 08:09 AM    RBC 4.69 11/07/2020 09:09 AM    HGB 13.7 11/10/2021 08:09 AM    HGB 13.2 11/07/2020 09:09 AM    HCT 43.5 11/10/2021 08:09 AM    HCT 41.1 11/07/2020 09:09 AM     11/10/2021 08:09 AM     11/07/2020 09:09 AM     07/26/2013 08:35 AM     05/10/2011 09:05 AM    NEUT 65 04/14/2020 05:47 AM    LYMP 24 04/14/2020 05:47 AM    MON 10 04/14/2020 05:47 AM    EOS 0 04/14/2020 05:47 AM      Lab Results   Component Value Date/Time    SODIUM 137 11/10/2021 08:09 AM    SODIUM 137 11/07/2020 09:09 AM    POTASSIUM 4.4 11/10/2021 08:09 AM    POTASSIUM 4.3 11/07/2020 09:09 AM    CHLORIDE 104 11/10/2021 08:09 AM    CHLORIDE 102 11/07/2020 09:09 AM    CO2 27 11/10/2021 08:09 AM    CO2 29 11/07/2020 09:09 AM    CO2 27 06/05/2012 08:07 AM    BUN 15 11/10/2021 08:09 AM    BUN 19 11/07/2020 09:09 AM    CREATININE 0.73 11/10/2021 08:09 AM    CREATININE 0.70 11/07/2020 09:09 AM    CALCIUM 9.2 11/10/2021 08:09 AM    CALCIUM 9.3 11/07/2020 09:09 AM    CALCIUM 9.1 06/05/2012 08:07 AM    ALBUMIN 3.6 11/10/2021 08:09 AM    ALBUMIN 3.3 (L) 11/07/2020 09:09 AM    BILIRUBIN 0.6 11/10/2021 08:09 AM    BILIRUBIN 0.4 11/07/2020 09:09 AM    ALKPT 70  11/10/2021 08:09 AM    ALKPT 67 11/07/2020 09:09 AM    GPT 33 11/10/2021 08:09 AM    GPT 34 11/07/2020 09:09 AM    AST 26 11/10/2021 08:09 AM    AST 26 11/07/2020 09:09 AM    GLUCOSE 95 11/10/2021 08:09 AM    GLUCOSE 89 11/07/2020 09:09 AM    HGBA1C 5.2 01/19/2017 11:35 PM     Lab Results   Component Value Date/Time    CHOLESTEROL 143 11/10/2021 08:09 AM    CHOLESTEROL 131 11/07/2020 09:09 AM    CHOLESTEROL 139 11/06/2019 08:43 AM    TRIGLYCERIDE 91 11/10/2021 08:09 AM    TRIGLYCERIDE 97 11/07/2020 09:09 AM    TRIGLYCERIDE 118 11/06/2019 08:43 AM    HDL 43 (L) 11/10/2021 08:09 AM    HDL 45 (L) 11/07/2020 09:09 AM    HDL 36 (L) 11/06/2019 08:43 AM    CALCLDL 82 11/10/2021 08:09 AM    CALCLDL 67 11/07/2020 09:09 AM    CALCLDL 79 11/06/2019 08:43 AM      Lab Results   Component Value Date/Time    TSH 1.383 11/07/2020 09:09 AM    T4FREE 1.3 01/18/2017 05:12 AM       Results for orders placed or performed in visit on 08/11/20   ELECTROCARDIOGRAM 12-LEAD    Impression    NSR. 61 bpm     ECHO: 12/2020:  1. Left ventricle: The cavity size is normal. Wall thickness is normal.     Systolic function is mildly reduced. The ejection fraction was measured     by biplane method of disks. Mild global hypokinesis. Although no     diagnostic regional wall motion abnormality is identified, this     possibility cannot be completely excluded on the basis of this study.     Unable to accurately assess left ventricular diastolic function     parameters due to atrial fibrillation. The ejection fraction is 45%.  2. Aortic valve: Mild regurgitation.  3. Mitral valve: Prior procedures include surgical repair. There is a     normally functioning annuloplasty ring. The prosthesis has a normal     range of motion. No significant regurgitation.  4. Right ventricle: Systolic function is low normal. Systolic pressure is     within the normal range. The estimated peak pressure is 21mm Hg.  5. Tricuspid valve: Mild regurgitation.  6. Pericardium,  extracardiac: There is no pericardial effusion.  Impressions:   The study shows no significant change since the study of  11/15/2019.       IMPRESSION AND PLAN:  1. Chronic combined systolic and diastolic heart failure (CMS/HCC)    2. Non-rheumatic mitral valve disease    3. Benign essential HTN    4. Cardiomyopathy, unspecified type (CMS/HCC)    5. Mixed hyperlipidemia        Plan:  -From cardiac perspective she is doing well.  She is clinically euvolemic and in NYHA class II at this time.  Her CHF is stable.  -Echocardiogram performed in December 2020 showed LVEF of 45%.  -Due to cardiomyopathy with underlying CHF, will start her on low-dose losartan 12.5 mg twice a day along with Coreg 3.125 mg twice a day.  She is tolerating it well.  If blood pressure remains high during follow-up visit then may increase the dose.  -She underwent successful atrial flutter ablation performed by Dr. Rogel in November 2019.  She is currently doing well and remained in sinus rhythm from physical examination.    -Patient is currently not on anticoagulant therapy due to parvovirus anemia.  Patient also had atrial flutter.  At this time will defer anticoagulant strategy with electrophysiologist, Dr. Rogel.  She is monitoring her rhythm with mobile EKG monitoring device regularly.  -Recommend patient to drink and restrict the water to 1.5 L/day and restrict salt intake to 2 g/day.  -She is able to perform walking at least 3 to 4 miles per day without any limitation.  She denies any chest pain or shortness of breath.  She had foot pain recently which limited her symptoms which has improved now.  -Continue simvastatin 20 mg daily for dyslipidemia.  Her LDL was 82 in November 2021.  We will continue the same.    All question answered the patient.    No orders of the defined types were placed in this encounter.       Return in about 8 months (around 10/23/2022).    Rudy Frias MD  Cardiology  Portions of this  note may have been  created using the Dragon voice recognition system.  Errors in content may be related to improper recognition of the system.  Effort to review and correct the note has been made but irregularities may still be present.  Medication reconciliation was done but medications not prescribed by me may be inaccurate.

## 2023-07-31 ENCOUNTER — HOSPITAL ENCOUNTER (OUTPATIENT)
Dept: SLEEP MEDICINE | Facility: HOSPITAL | Age: 48
Discharge: HOME | End: 2023-07-31
Admitting: FAMILY MEDICINE
Payer: COMMERCIAL

## 2023-07-31 VITALS — HEIGHT: 74 IN | WEIGHT: 227 LBS | BODY MASS INDEX: 29.13 KG/M2

## 2023-07-31 DIAGNOSIS — R06.81 WITNESSED EPISODE OF APNEA: ICD-10-CM

## 2023-07-31 DIAGNOSIS — R06.83 SNORING: ICD-10-CM

## 2023-07-31 PROCEDURE — 95800 SLP STDY UNATTENDED: CPT

## 2023-08-03 PROCEDURE — 95800 SLP STDY UNATTENDED: CPT | Performed by: INTERNAL MEDICINE

## 2023-09-05 DIAGNOSIS — K21.9 GASTROESOPHAGEAL REFLUX DISEASE WITHOUT ESOPHAGITIS: ICD-10-CM

## 2023-09-05 RX ORDER — DEXLANSOPRAZOLE 60 MG/1
CAPSULE, DELAYED RELEASE ORAL
Qty: 90 CAPSULE | Refills: 3 | Status: SHIPPED | OUTPATIENT
Start: 2023-09-05

## 2023-09-06 DIAGNOSIS — K21.9 GASTROESOPHAGEAL REFLUX DISEASE WITHOUT ESOPHAGITIS: ICD-10-CM

## 2023-09-07 RX ORDER — DEXLANSOPRAZOLE 60 MG/1
CAPSULE, DELAYED RELEASE ORAL
Qty: 90 CAPSULE | Refills: 3 | OUTPATIENT
Start: 2023-09-07

## 2023-10-30 ENCOUNTER — TELEPHONE (OUTPATIENT)
Dept: FAMILY MEDICINE CLINIC | Facility: CLINIC | Age: 48
End: 2023-10-30
Payer: COMMERCIAL

## 2023-10-30 DIAGNOSIS — H60.331 ACUTE SWIMMER'S EAR OF RIGHT SIDE: ICD-10-CM

## 2023-10-30 RX ORDER — CIPROFLOXACIN/HYDROCORTISONE 0.2 %-1 %
SUSPENSION, DROPS(FINAL DOSAGE FORM)(ML) OTIC (EAR)
Qty: 10 ML | Refills: 0 | OUTPATIENT
Start: 2023-10-30

## 2023-10-30 NOTE — TELEPHONE ENCOUNTER
Caller: Yony Parsons    Relationship: Self    Best call back number:       [phone] (Mobile)     What is the best time to reach you:     ANY TIME    Who are you requesting to speak with (clinical staff, provider,  specific staff member):     PANCHO SALVADOR    What was the call regarding:     PATIENT REQUESTED A CALL BACK FROM EITHER PANCHO SALVADOR OR NURSE WITH CONFIRMATION THAT A REFILL REQUEST FOR EAR DROPS COULD BE FORWARDED TO PHARMACY LISTED BELOW SINCE PATIENT WAS PRESCRIBED MEDICATION BY PANCHO SALVADOR 2/2023    PATIENT HAS AN APPOINTMENT SCHEDULED FOR TOMORROW, 10/31, WITH DR FLORES AT 9:30 A.M.    PLEASE CONTACT PATIENT THIS AFTERNOON IF POSSIBLE IF TOMORROW'S APPOINTMENT ISN'T NECESSARY    PREFERRED PHARMACY:    [facility] [place], [place]    TELEPHONE CONTACT:    [phone]

## 2023-10-30 NOTE — TELEPHONE ENCOUNTER
Caller: Yony Parsons    Relationship: Self    Best call back number:      [phone] (Mobile)     What medication are you requesting:     CIPRO HC OTIC EAR DROPS    What are your current symptoms:     EAR PAIN  EAR CONGESTION    How long have you been experiencing symptoms:     4- 5 DAYS    If a prescription is needed, what is your preferred pharmacy and phone number:      [place], [place]    TELEPHONE CONTACT:    [phone]    PATIENT STATED IF DR PRICE REQUIRES HIM TO SCHEDULE AN APPOINTMENT PRIOR TO PRESCRIBING MEDICATION, PLEASE CONTACT HIM AT TELEPHONE NUMBER LISTED ABOVE

## 2023-10-30 NOTE — TELEPHONE ENCOUNTER
Patient was informed he would have to be seen and then scheduled appt at 9:30am tomorrow before any prescriptions could be sent in.

## 2023-10-31 ENCOUNTER — OFFICE VISIT (OUTPATIENT)
Dept: FAMILY MEDICINE CLINIC | Facility: CLINIC | Age: 48
End: 2023-10-31
Payer: COMMERCIAL

## 2023-10-31 VITALS
SYSTOLIC BLOOD PRESSURE: 120 MMHG | HEIGHT: 74 IN | BODY MASS INDEX: 29.39 KG/M2 | HEART RATE: 80 BPM | OXYGEN SATURATION: 98 % | WEIGHT: 229 LBS | DIASTOLIC BLOOD PRESSURE: 82 MMHG

## 2023-10-31 DIAGNOSIS — J30.2 SEASONAL ALLERGIC RHINITIS, UNSPECIFIED TRIGGER: ICD-10-CM

## 2023-10-31 DIAGNOSIS — H60.332 ACUTE SWIMMER'S EAR OF LEFT SIDE: Primary | ICD-10-CM

## 2023-10-31 RX ORDER — AZELASTINE HYDROCHLORIDE AND FLUTICASONE PROPIONATE 137; 50 UG/1; UG/1
1 SPRAY, METERED NASAL DAILY
Qty: 23 G | Refills: 11 | Status: SHIPPED | OUTPATIENT
Start: 2023-10-31 | End: 2023-11-02

## 2023-10-31 RX ORDER — CIPROFLOXACIN/HYDROCORTISONE 0.2 %-1 %
3 SUSPENSION, DROPS(FINAL DOSAGE FORM)(ML) OTIC (EAR) 2 TIMES DAILY
Qty: 3 ML | Refills: 0 | Status: SHIPPED | OUTPATIENT
Start: 2023-10-31 | End: 2023-11-07

## 2023-10-31 NOTE — ASSESSMENT & PLAN NOTE
We will prescribe Cipro HC drops 3 drops to left ear twice daily for 7 days.  Patient to return to clinic if symptoms persist or worsen.

## 2023-10-31 NOTE — PROGRESS NOTES
Office Note     Name: Yony Parsons    : 1975     MRN: [id number]     Chief Complaint  Earache (Left earache)    Subjective     History of Present Illness:  Yony Parsons is a 48 y.o. male who presents today for evaluation of left earache.  He currently lives at home with his wife and works for an [employer] company.      She reports that about 1 week ago he did spend some time in a hot tub.  He reports that he is unsure if the hot tub was clean.  He states that since that time he has been having progressive worsening of left earache.  He reports he does have a chronic history of otitis externa.  He last had an exacerbation in February but this was on his right ear.  At the time he was prescribed Cipro HC drops and did have rapid improvement of his symptoms.  Patient denies any fevers or radiating jaw pain.  No other acute complaints today.    Review of Systems:   Review of Systems   HENT:  Positive for ear pain.    All other systems reviewed and are negative.      Past Medical History:   Past Medical History:   Diagnosis Date    Allergic     seasonal allergies    Allergic rhinitis Lifelong    especially fall    Anal fissure 2006    Chronic gastric erosion 2014    EGD positive for gastritis and esophagitis    Colon adenomas 2007    With rectal adenoma    GERD (gastroesophageal reflux disease) 2006    Severe refractory    IBS (irritable bowel syndrome) 2006    Overactive bladder 2013    Pelvic pain     Negative urologic workup    Prediabetes 2017    Hemoglobin A1c 6.2    Shingles 2018    Right T10 and T12 dermatomes       Past Surgical History:   Past Surgical History:   Procedure Laterality Date    APPENDECTOMY  1992    CHOLECYSTECTOMY  2006    COLONOSCOPY  2017    Normal study    COLONOSCOPY W/ BIOPSIES      COLONOSCOPY W/ POLYPECTOMY  2007    With rectal adenoma    KNEE ARTHROSCOPY Right 2016    ACL repair    KNEE SURGERY Left 2008    ACL repair       Family History:   Family  History   Problem Relation Age of Onset    Diabetes type II Mother 60    No Known Problems Father     No Known Problems Sister     No Known Problems Brother     Prostate cancer Maternal Grandfather     Lung cancer Paternal Grandfather     Breast cancer Neg Hx     Ovarian cancer Neg Hx        Social History:   Social History     Socioeconomic History    Marital status:    Tobacco Use    Smoking status: Former     Packs/day: 0.10     Years: 10.00     Additional pack years: 0.00     Total pack years: 1.00     Types: Cigarettes     Start date: 1997     Quit date: 2007     Years since quittin.8    Smokeless tobacco: Never   Substance and Sexual Activity    Alcohol use: Yes     Alcohol/week: 4.0 standard drinks of alcohol     Types: 4 Glasses of wine per week     Comment: light social drinking    Drug use: No    Sexual activity: Yes     Partners: Female     Comment:        Immunizations:   Immunization History   Administered Date(s) Administered    COVID-19 (MODERNA) 1st,2nd,3rd Dose Monovalent 2021, 2021, 2021    Flu Vaccine Intradermal Quad 18-64YR 10/15/2018    FluMist 2-49yrs 2015, 2016    Fluzone Quad >6mos (Multi-dose) 10/22/2018    Tdap 10/15/2014        Medications:     Current Outpatient Medications:     albuterol sulfate  (90 Base) MCG/ACT inhaler, INHALE 2 PUFFS BY MOUTH EVERY 4 HOURS AS NEEDED FOR WHEEZING OR SHORTNESS OF BREATH, Disp: 8.5 g, Rfl: 0    calcium carbonate (TUMS) 500 MG chewable tablet, Chew 1 tablet As Needed., Disp: , Rfl:     Dexilant 60 MG capsule, TAKE 1 CAPSULE BY MOUTH DAILY, Disp: 90 capsule, Rfl: 3    diclofenac (VOLTAREN) 1 % gel gel, apply 2 grams to affected area ON UPPER EXTREMITIES four times a ...  (REFER TO PRESCRIPTION NOTES)., Disp: , Rfl: 0    Dymista 137-50 MCG/ACT suspension, 1 spray by Each Nare route Daily., Disp: 23 g, Rfl: 11    Flovent  MCG/ACT inhaler, INHALE 1 PUFF BY MOUTH TWICE DAILY, Disp: 12 g,  "Rfl: 0    Multiple Vitamins-Minerals (MENS MULTI VITAMIN & MINERAL PO), Take 1 tablet by mouth Every Morning., Disp: , Rfl:     ciprofloxacin-hydrocortisone (Cipro HC) 0.2-1 % otic suspension, Administer 3 drops into the left ear 2 (Two) Times a Day for 7 days., Disp: 3 mL, Rfl: 0    Allergies:   Allergies   Allergen Reactions    No Known Drug Allergy        Objective     Vital Signs  /82   Pulse 80   Ht 188 cm (74.02\")   Wt 104 kg (229 lb)   SpO2 98%   BMI 29.39 kg/m²   Estimated body mass index is 29.39 kg/m² as calculated from the following:    Height as of this encounter: 188 cm (74.02\").    Weight as of this encounter: 104 kg (229 lb).         Physical Exam  Constitutional:       Appearance: Normal appearance. He is normal weight.   HENT:      Head: Normocephalic and atraumatic.      Right Ear: Tympanic membrane, ear canal and external ear normal.      Left Ear: Swelling and tenderness present.      Ears:      Comments: Erythema to ear canal     Nose: Nose normal.      Mouth/Throat:      Mouth: Mucous membranes are moist.   Eyes:      Extraocular Movements: Extraocular movements intact.      Pupils: Pupils are equal, round, and reactive to light.   Cardiovascular:      Rate and Rhythm: Normal rate and regular rhythm.   Pulmonary:      Effort: Pulmonary effort is normal.      Breath sounds: Normal breath sounds.   Abdominal:      General: Abdomen is flat. Bowel sounds are normal.      Palpations: Abdomen is soft.   Musculoskeletal:         General: Normal range of motion.      Cervical back: Normal range of motion.   Skin:     General: Skin is warm and dry.   Neurological:      General: No focal deficit present.      Mental Status: He is alert.   Psychiatric:         Mood and Affect: Mood normal.         Behavior: Behavior normal.         Thought Content: Thought content normal.         Judgment: Judgment normal.            Procedures     Results:  No results found for this or any previous visit (from " the past 24 hour(s)).     Assessment and Plan     Assessment/Plan:  Diagnoses and all orders for this visit:    1. Acute swimmer's ear of left side (Primary)  Assessment & Plan:  We will prescribe Cipro HC drops 3 drops to left ear twice daily for 7 days.  Patient to return to clinic if symptoms persist or worsen.    Orders:  -     ciprofloxacin-hydrocortisone (Cipro HC) 0.2-1 % otic suspension; Administer 3 drops into the left ear 2 (Two) Times a Day for 7 days.  Dispense: 3 mL; Refill: 0    2. Seasonal allergic rhinitis, unspecified trigger  Assessment & Plan:  We will refill Dymista    Orders:  -     Dymista 137-50 MCG/ACT suspension; 1 spray by Each Nare route Daily.  Dispense: 23 g; Refill: 11        Follow Up  Return if symptoms worsen or fail to improve.    Petra Neal DO   [facility]

## 2023-11-02 ENCOUNTER — TELEPHONE (OUTPATIENT)
Dept: FAMILY MEDICINE CLINIC | Facility: CLINIC | Age: 48
End: 2023-11-02
Payer: COMMERCIAL

## 2023-11-02 DIAGNOSIS — J30.2 SEASONAL ALLERGIC RHINITIS, UNSPECIFIED TRIGGER: ICD-10-CM

## 2023-11-02 RX ORDER — AZELASTINE HYDROCHLORIDE, FLUTICASONE PROPIONATE 137; 50 UG/1; UG/1
1 SPRAY, METERED NASAL 2 TIMES DAILY
Qty: 23 G | Refills: 11 | Status: SHIPPED | OUTPATIENT
Start: 2023-11-02

## 2023-11-02 NOTE — TELEPHONE ENCOUNTER
Caller: Yony Parsons    Relationship: Self    Best call back number:[phone]     Which medication are you concerned about: KHAIMISTROSY    Who prescribed you this medication: DEEPTHI FLORES    When did you start taking this medication:     What are your concerns: INSURANCE WILL ONLY PAY FOR THE GENERIC VERSION. HE IS FINE WITH THE GENERIC VERSION

## 2023-11-02 NOTE — TELEPHONE ENCOUNTER
Okay I sent it in as the generic for both medications.  Some insurances will cover this, however some do need to specify the azelastine and fluticasone be in separate prescriptions.  If he needs me to call him and separately please let me know

## 2023-12-05 ENCOUNTER — OFFICE VISIT (OUTPATIENT)
Dept: FAMILY MEDICINE CLINIC | Facility: CLINIC | Age: 48
End: 2023-12-05
Payer: COMMERCIAL

## 2023-12-05 VITALS
OXYGEN SATURATION: 97 % | DIASTOLIC BLOOD PRESSURE: 86 MMHG | SYSTOLIC BLOOD PRESSURE: 120 MMHG | BODY MASS INDEX: 29.16 KG/M2 | HEART RATE: 81 BPM | HEIGHT: 74 IN | WEIGHT: 227.2 LBS

## 2023-12-05 DIAGNOSIS — R05.3 CHRONIC COUGH: ICD-10-CM

## 2023-12-05 PROCEDURE — 99213 OFFICE O/P EST LOW 20 MIN: CPT | Performed by: FAMILY MEDICINE

## 2023-12-05 RX ORDER — DEXAMETHASONE 4 MG/1
1 TABLET ORAL 2 TIMES DAILY
Qty: 12 G | Refills: 1 | Status: SHIPPED | OUTPATIENT
Start: 2023-12-05

## 2023-12-05 NOTE — PROGRESS NOTES
Office Note     Name: Yony Parsons    : 1975     MRN: [id number]     Chief Complaint  Cough (Pt states he's been having a cough for over 6 weeks and he states that it has gotten better but at night he states that his chest gets congested and in the mornings he coughs it all up.)    Subjective     History of Present Illness:  Yony Parsons is a 48 y.o. male who presents today for evaluation of chronic cough.    Patient states for the past 6 weeks he has had a chronic cough.  He states that originally he was having an upper respiratory infection that seem to drain into his chest.  He states that symptoms seems improving but over the past few weeks his symptoms seem to be worsening.  He states that at night he will develop congestion in his chest and when he wakes up in the morning he will cough all this up.  He states it is a clear sputum.  He has been having nighttime cough as well.  Patient states that he has similar episode last year and underwent antibiotics, p.o. steroids, and a Flovent inhaler.  He does feel the inhaler helped.  Patient does have an albuterol inhaler that he is using, and has used it at least 4 times per day.  He has not had his Flovent inhaler.    Review of Systems:   Review of Systems   Respiratory:  Positive for cough.    All other systems reviewed and are negative.      Past Medical History:   Past Medical History:   Diagnosis Date    Allergic     seasonal allergies    Allergic rhinitis Lifelong    especially fall    Anal fissure 2006    Chronic gastric erosion 2014    EGD positive for gastritis and esophagitis    Colon adenomas 2007    With rectal adenoma    GERD (gastroesophageal reflux disease) 2006    Severe refractory    IBS (irritable bowel syndrome) 2006    Overactive bladder 2013    Pelvic pain 2010    Negative urologic workup    Prediabetes 2017    Hemoglobin A1c 6.2    [facility] 2016    Shingles 2018    Right T10 and T12 dermatomes        Past Surgical History:   Past Surgical History:   Procedure Laterality Date    APPENDECTOMY  1992    CHOLECYSTECTOMY  2006    COLONOSCOPY  2017    Normal study    COLONOSCOPY W/ BIOPSIES      COLONOSCOPY W/ POLYPECTOMY      With rectal adenoma    KNEE ARTHROSCOPY Right 2016    ACL repair    KNEE SURGERY Left 2008    ACL repair       Family History:   Family History   Problem Relation Age of Onset    Diabetes type II Mother 60    No Known Problems Father     No Known Problems Sister     No Known Problems Brother     Prostate cancer Maternal Grandfather     Lung cancer Paternal Grandfather     Breast cancer Neg Hx     Ovarian cancer Neg Hx        Social History:   Social History     Socioeconomic History    Marital status:    Tobacco Use    Smoking status: Former     Packs/day: 0.10     Years: 10.00     Additional pack years: 0.00     Total pack years: 1.00     Types: Cigarettes     Start date: 1997     Quit date: 2007     Years since quittin.9    Smokeless tobacco: Never   Substance and Sexual Activity    Alcohol use: Yes     Alcohol/week: 4.0 standard drinks of alcohol     Types: 4 Glasses of wine per week     Comment: light social drinking    Drug use: No    Sexual activity: Yes     Partners: Female     Comment:        Immunizations:   Immunization History   Administered Date(s) Administered    COVID-19 (MODERNA) 1st,2nd,3rd Dose Monovalent 2021, 2021, 2021    Flu Vaccine Intradermal Quad 18-64YR 10/15/2018    FluMist 2-49yrs 2015, 2016    Fluzone Quad >6mos (Multi-dose) 10/22/2018    Tdap 10/15/2014        Medications:     Current Outpatient Medications:     albuterol sulfate  (90 Base) MCG/ACT inhaler, INHALE 2 PUFFS BY MOUTH EVERY 4 HOURS AS NEEDED FOR WHEEZING OR SHORTNESS OF BREATH, Disp: 8.5 g, Rfl: 0    Azelastine-Fluticasone 137-50 MCG/ACT suspension, 1 spray into the nostril(s) as directed by provider 2 (Two) Times a Day.  "Indications: Hayfever, Disp: 23 g, Rfl: 11    calcium carbonate (TUMS) 500 MG chewable tablet, Chew 1 tablet As Needed., Disp: , Rfl:     Dexilant 60 MG capsule, TAKE 1 CAPSULE BY MOUTH DAILY, Disp: 90 capsule, Rfl: 3    diclofenac (VOLTAREN) 1 % gel gel, apply 2 grams to affected area ON UPPER EXTREMITIES four times a ...  (REFER TO PRESCRIPTION NOTES)., Disp: , Rfl: 0    Flovent  MCG/ACT inhaler, Inhale 1 puff 2 (Two) Times a Day., Disp: 12 g, Rfl: 1    montelukast (Singulair) 10 MG tablet, Take 1 tablet by mouth Every Night., Disp: 30 tablet, Rfl: 2    Multiple Vitamins-Minerals (MENS MULTI VITAMIN & MINERAL PO), Take 1 tablet by mouth Every Morning., Disp: , Rfl:     Allergies:   Allergies   Allergen Reactions    No Known Drug Allergy        Objective     Vital Signs  /86   Pulse 81   Ht 188 cm (74.02\")   Wt 103 kg (227 lb 3.2 oz)   SpO2 97%   BMI 29.16 kg/m²   Estimated body mass index is 29.16 kg/m² as calculated from the following:    Height as of this encounter: 188 cm (74.02\").    Weight as of this encounter: 103 kg (227 lb 3.2 oz).      Physical Exam  Nursing note reviewed.   Constitutional:       Appearance: Normal appearance. He is normal weight.   HENT:      Head: Normocephalic and atraumatic.      Nose: Nose normal.      Mouth/Throat:      Mouth: Mucous membranes are moist.   Eyes:      Extraocular Movements: Extraocular movements intact.      Pupils: Pupils are equal, round, and reactive to light.   Cardiovascular:      Rate and Rhythm: Normal rate and regular rhythm.      Heart sounds: Normal heart sounds.   Pulmonary:      Effort: Pulmonary effort is normal.      Breath sounds: Normal breath sounds.   Abdominal:      General: Abdomen is flat. Bowel sounds are normal.      Palpations: Abdomen is soft.   Musculoskeletal:         General: Normal range of motion.      Cervical back: Normal range of motion.   Skin:     General: Skin is warm and dry.   Neurological:      General: No " focal deficit present.      Mental Status: He is alert.   Psychiatric:         Mood and Affect: Mood normal.         Behavior: Behavior normal.         Thought Content: Thought content normal.         Judgment: Judgment normal.            Procedures     Results:  No results found for this or any previous visit (from the past 24 hour(s)).     Assessment and Plan     Assessment/Plan:  Diagnoses and all orders for this visit:    1. Chronic cough  Assessment & Plan:  Likely secondary to reactive airway disease.  Continue albuterol and antihistamine regimen.  Recommend patient restart Flovent inhaler twice daily for next 2 weeks.  Advised patient if he develops fever or a productive sputum please return to clinic for follow-up evaluation.    Orders:  -     Flovent  MCG/ACT inhaler; Inhale 1 puff 2 (Two) Times a Day.  Dispense: 12 g; Refill: 1        Follow Up  No follow-ups on file.    Petra Neal DO   [facility]

## 2023-12-05 NOTE — ASSESSMENT & PLAN NOTE
Likely secondary to reactive airway disease.  Continue albuterol and antihistamine regimen.  Recommend patient restart Flovent inhaler twice daily for next 2 weeks.  Advised patient if he develops fever or a productive sputum please return to clinic for follow-up evaluation.   Vaccine Information Statement    Tdap (Tetanus, Diphtheria, Pertussis) Vaccine: What You Need to Know     Many vaccine information statements are available in Estonian and other languages. See www.immunize.org/vis. Hojas de información sobre vacunas están disponibles en español y en muchos otros idiomas. Visite www.immunize.org/vis. 1. Why get vaccinated? Tdap vaccine can prevent tetanus, diphtheria, and pertussis. Diphtheria and pertussis spread from person to person. Tetanus enters the body through cuts or wounds. TETANUS (T) causes painful stiffening of the muscles. Tetanus can lead to serious health problems, including being unable to open the mouth, having trouble swallowing and breathing, or death. DIPHTHERIA (D) can lead to difficulty breathing, heart failure, paralysis, or death. PERTUSSIS (aP), also known as whooping cough, can cause uncontrollable, violent coughing that makes it hard to breathe, eat, or drink. Pertussis can be extremely serious especially in babies and young children, causing pneumonia, convulsions, brain damage, or death. In teens and adults, it can cause weight loss, loss of bladder control, passing out, and rib fractures from severe coughing. 2. Tdap vaccine     Tdap is only for children 7 years and older, adolescents, and adults. Adolescents should receive a single dose of Tdap, preferably at age 6 or 15 years. Pregnant people should get a dose of Tdap during every pregnancy, preferably during the early part of the third trimester, to help protect the  from pertussis. Infants are most at risk for severe, life-threatening complications from pertussis. Adults who have never received Tdap should get a dose of Tdap.       Also, adults should receive a booster dose of either Tdap or Td (a different vaccine that protects against tetanus and diphtheria but not pertussis) every 10 years, or after 5 years in the case of a severe or dirty wound or burn.     Tdap may be given at the same time as other vaccines. 3. Talk with your health care provider    Tell your vaccination provider if the person getting the vaccine:  Has had an allergic reaction after a previous dose of any vaccine that protects against tetanus, diphtheria, or pertussis, or has any severe, life-threatening allergies   Has had a coma, decreased level of consciousness, or prolonged seizures within 7 days after a previous dose of any pertussis vaccine (DTP, DTaP, or Tdap)  Has seizures or another nervous system problem  Has ever had Guillain-Barré Syndrome (also called GBS)  Has had severe pain or swelling after a previous dose of any vaccine that protects against tetanus or diphtheria    In some cases, your health care provider may decide to postpone Tdap vaccination until a future visit. People with minor illnesses, such as a cold, may be vaccinated. People who are moderately or severely ill should usually wait until they recover before getting Tdap vaccine. Your health care provider can give you more information. 4. Risks of a vaccine reaction    Pain, redness, or swelling where the shot was given, mild fever, headache, feeling tired, and nausea, vomiting, diarrhea, or stomachache sometimes happen after Tdap vaccination. People sometimes faint after medical procedures, including vaccination. Tell your provider if you feel dizzy or have vision changes or ringing in the ears. As with any medicine, there is a very remote chance of a vaccine causing a severe allergic reaction, other serious injury, or death. 5. What if there is a serious problem? An allergic reaction could occur after the vaccinated person leaves the clinic.  If you see signs of a severe allergic reaction (hives, swelling of the face and throat, difficulty breathing, a fast heartbeat, dizziness, or weakness), call 9-1-1 and get the person to the nearest hospital.    For other signs that concern you, call your health care provider. Adverse reactions should be reported to the Vaccine Adverse Event Reporting System (VAERS). Your health care provider will usually file this report, or you can do it yourself. Visit the VAERS website at www.vaers. [facility].gov or call 7-684.571.8503. VAERS is only for reporting reactions, and VAERS staff members do not give medical advice. 6. The National Vaccine Injury Compensation Program    The [facility] Vaccine Injury Compensation Program (VICP) is a federal program that was created to compensate people who may have been injured by certain vaccines. Claims regarding alleged injury or death due to vaccination have a time limit for filing, which may be as short as two years. Visit the VICP website at www.[facility].gov/vaccinecompensation or call 6-562.695.5425 to learn about the program and about filing a claim. 7. How can I learn more? Ask your health care provider. Call your local or state health department. Visit the website of the Food and Drug Administration (FDA) for vaccine package inserts and additional information at www.fda.gov/vaccines-blood-biologics/vaccines. Contact the Centers for Disease Control and Prevention (CDC): Call 2-753.222.6132 (1-800-CDC-INFO) or  Visit CDCs website at www.cdc.gov/vaccines. Vaccine Information Statement   Tdap (Tetanus, Diphtheria, Pertussis) Vaccine  8/6/2021  42 [address] [place] [id number]     Department of Health and Human Services  Centers for Disease Control and Prevention    Office Use Only  Vaccine Information Statement    Influenza (Flu) Vaccine (Inactivated or Recombinant): What You Need to Know    Many vaccine information statements are available in Turkmen and other languages. See www.immunize.org/vis. Hojas de información sobre vacunas están disponibles en español y en muchos otros idiomas. Visite www.immunize.org/vis. 1. Why get vaccinated? Influenza vaccine can prevent influenza (flu).     Flu is a contagious disease that spreads around the [place] every year, usually between October and May. Anyone can get the flu, but it is more dangerous for some people. Infants and young children, people 72 years and older, pregnant people, and people with certain health conditions or a weakened immune system are at greatest risk of flu complications. Pneumonia, bronchitis, sinus infections, and ear infections are examples of flu-related complications. If you have a medical condition, such as heart disease, cancer, or diabetes, flu can make it worse. Flu can cause fever and chills, sore throat, muscle aches, fatigue, cough, headache, and runny or stuffy nose. Some people may have vomiting and diarrhea, though this is more common in children than adults. In an average year, thousands of people in the [facility] die from flu, and many more are hospitalized. Flu vaccine prevents millions of illnesses and flu-related visits to the doctor each year. 2. Influenza vaccines     CDC recommends everyone 6 months and older get vaccinated every flu season. Children 6 months through 6years of age may need 2 doses during a single flu season. Everyone else needs only 1 dose each flu season. It takes about 2 weeks for protection to develop after vaccination. There are many flu viruses, and they are always changing. Each year a new flu vaccine is made to protect against the influenza viruses believed to be likely to cause disease in the upcoming flu season. Even when the vaccine doesnt exactly match these viruses, it may still provide some protection. Influenza vaccine does not cause flu. Influenza vaccine may be given at the same time as other vaccines.     3. Talk with your health care provider    Tell your vaccination provider if the person getting the vaccine:  Has had an allergic reaction after a previous dose of influenza vaccine, or has any severe, life-threatening allergies   Has ever had Guillain-Barré Syndrome (also called GBS)    In some cases, your health care provider may decide to postpone influenza vaccination until a future visit. Influenza vaccine can be administered at any time during pregnancy. People who are or will be pregnant during influenza season should receive inactivated influenza vaccine. People with minor illnesses, such as a cold, may be vaccinated. People who are moderately or severely ill should usually wait until they recover before getting influenza vaccine. Your health care provider can give you more information. 4. Risks of a vaccine reaction    Soreness, redness, and swelling where the shot is given, fever, muscle aches, and headache can happen after influenza vaccination. There may be a very small increased risk of Guillain-Barré Syndrome (GBS) after inactivated influenza vaccine (the flu shot). Nagi Polanco children who get the flu shot along with pneumococcal vaccine (PCV13) and/or DTaP vaccine at the same time might be slightly more likely to have a seizure caused by fever. Tell your health care provider if a child who is getting flu vaccine has ever had a seizure. People sometimes faint after medical procedures, including vaccination. Tell your provider if you feel dizzy or have vision changes or ringing in the ears. As with any medicine, there is a very remote chance of a vaccine causing a severe allergic reaction, other serious injury, or death. 5. What if there is a serious problem? An allergic reaction could occur after the vaccinated person leaves the clinic. If you see signs of a severe allergic reaction (hives, swelling of the face and throat, difficulty breathing, a fast heartbeat, dizziness, or weakness), call 9-1-1 and get the person to the nearest hospital.    For other signs that concern you, call your health care provider. Adverse reactions should be reported to the Vaccine Adverse Event Reporting System (VAERS).  Your health care provider will usually file this report, or you can do it yourself. Visit the VAERS website at www.vaers. [facility].gov or call 8-416.327.1624. VAERS is only for reporting reactions, and VAERS staff members do not give medical advice. 6. The National Vaccine Injury Compensation Program    The [facility] Vaccine Injury Compensation Program (VICP) is a federal program that was created to compensate people who may have been injured by certain vaccines. Claims regarding alleged injury or death due to vaccination have a time limit for filing, which may be as short as two years. Visit the VICP website at [facility].gov/vaccinecompensation or call 4-505.630.3153 to learn about the program and about filing a claim. 7. How can I learn more? Ask your health care provider. Call your local or state health department. Visit the website of the Food and Drug Administration (FDA) for vaccine package inserts and additional information at www.fda.gov/vaccines-blood-biologics/vaccines. Contact the Centers for Disease Control and Prevention (CDC): Call 6-921.296.3410 (1-800-CDC-INFO) or  Visit CDCs influenza website at www.cdc.gov/flu. Vaccine Information Statement   Inactivated Influenza Vaccine   8/6/2021  42 U. [employer] [id number]     Department of Health and Human Services  Centers for Disease Control and Prevention    Office Use Only

## 2023-12-22 DIAGNOSIS — R05.3 PERSISTENT COUGH FOR 3 WEEKS OR LONGER: ICD-10-CM

## 2023-12-22 RX ORDER — ALBUTEROL SULFATE 90 UG/1
AEROSOL, METERED RESPIRATORY (INHALATION)
Qty: 8.5 G | Refills: 0 | Status: SHIPPED | OUTPATIENT
Start: 2023-12-22

## 2024-01-10 DIAGNOSIS — R05.3 PERSISTENT COUGH FOR 3 WEEKS OR LONGER: ICD-10-CM

## 2024-01-10 RX ORDER — ALBUTEROL SULFATE 90 UG/1
AEROSOL, METERED RESPIRATORY (INHALATION)
Qty: 8.5 G | Refills: 0 | Status: SHIPPED | OUTPATIENT
Start: 2024-01-10

## 2024-02-19 RX ORDER — MONTELUKAST SODIUM 10 MG/1
10 TABLET ORAL NIGHTLY
Qty: 90 TABLET | Refills: 1 | Status: SHIPPED | OUTPATIENT
Start: 2024-02-19

## 2024-03-08 ENCOUNTER — TELEPHONE (OUTPATIENT)
Dept: FAMILY MEDICINE CLINIC | Facility: CLINIC | Age: 49
End: 2024-03-08
Payer: COMMERCIAL

## 2024-03-08 NOTE — TELEPHONE ENCOUNTER
(Key: [id number]) - [id number]  Dexilant 60MG dr capsules  Status: PA Response - Approved  Created: March 8th, 2024  Sent: March 8th, 2024    Approvedtoday  PA Case: [id number], Status: Approved, Coverage Starts on: 3/8/2024 12:00:00 AM, Coverage Ends on: 3/8/2025 12:00:00 AM.    INFORMED PT VIA [employer]

## 2024-03-08 NOTE — TELEPHONE ENCOUNTER
Caller: Yony Parsons    Relationship: Self    Best call back number: [phone]     What is the best time to reach you: ANY    Who are you requesting to speak with (clinical staff, provider,  specific staff member): DR. PRICE AND HIS NURSE    What was the call regarding: THE PATIENT IS NEEDING A PRIOR AUTHORIZATION FOR THE Dexilant 60 MG capsule AGAIN. THE OTHER HAS . PLEASE REDO A PA FOR IT SINCE THIS IS THE ONLY MED THAT WORKS FOR HIM. PLEASE LET HIM KNOW WHEN THIS IS DONE.     Is it okay if the provider responds through MyChart: NO

## 2024-03-12 DIAGNOSIS — K21.9 GASTROESOPHAGEAL REFLUX DISEASE WITHOUT ESOPHAGITIS: ICD-10-CM

## 2024-03-12 RX ORDER — DEXLANSOPRAZOLE 60 MG/1
1 CAPSULE, DELAYED RELEASE ORAL DAILY
Qty: 90 CAPSULE | Refills: 3 | Status: SHIPPED | OUTPATIENT
Start: 2024-03-12

## 2024-05-14 ENCOUNTER — TELEMEDICINE (OUTPATIENT)
Dept: FAMILY MEDICINE CLINIC | Facility: CLINIC | Age: 49
End: 2024-05-14
Payer: COMMERCIAL

## 2024-05-14 DIAGNOSIS — H60.331 ACUTE SWIMMER'S EAR OF RIGHT SIDE: Primary | ICD-10-CM

## 2024-05-14 PROCEDURE — 99213 OFFICE O/P EST LOW 20 MIN: CPT | Performed by: FAMILY MEDICINE

## 2024-05-14 RX ORDER — CIPROFLOXACIN/HYDROCORTISONE 0.2 %-1 %
3 SUSPENSION, DROPS(FINAL DOSAGE FORM)(ML) OTIC (EAR) 2 TIMES DAILY
Qty: 10 ML | Refills: 0 | Status: SHIPPED | OUTPATIENT
Start: 2024-05-14

## 2024-05-14 NOTE — PROGRESS NOTES
Subjective   Yony Parsons is a 49 y.o. male.     Chief Complaint   Patient presents with    Earache       History of Present Illness     Yony Parsons presents today for   Chief Complaint   Patient presents with    Earache     Has had previously, responds to antibiotics well    You have chosen to receive care through a telehealth visit.  Do you consent to use a video/audio connection for your medical care today? Yes    Patient location: [address] [place] [address]   Provider Location: [address] [place] [address]    The following portions of the patient's history were reviewed and updated as appropriate: allergies, current medications, past family history, past medical history, past social history, past surgical history and problem list.    Active Ambulatory Problems     Diagnosis Date Noted    Atopic rhinitis 08/16/2016    Chronic antral gastritis 08/16/2016    Gastroesophageal reflux disease 08/16/2016    Irritable bowel syndrome 08/16/2016    Disorder of prostate 08/16/2016    Preventative health care 08/18/2016    Osteoarthritis, knee 01/23/2017    Chronic anxiety 01/26/2017    Fasting hyperglycemia 05/04/2017    Shingles 05/22/2018    Neuralgia 05/22/2018    Epigastric pain 10/04/2019    Abnormal CAT scan 10/04/2019    Acute swimmer's ear of left side 10/31/2023    Chronic cough 12/05/2023     Resolved Ambulatory Problems     Diagnosis Date Noted    Abnormal electrocardiogram 08/16/2016    Serum creatinine raised 08/16/2016    Detrusor muscle hypertonia 08/16/2016    Pain in pelvis 08/16/2016    Chronic abdominal pain 10/17/2016    Chronic pain 01/23/2017    Neck pain 02/01/2017     Past Medical History:   Diagnosis Date    Allergic     Allergic rhinitis Lifelong    Anal fissure 2006    Chronic gastric erosion 2014    Colon adenomas 2007    GERD (gastroesophageal reflux disease) 2006    IBS (irritable bowel syndrome) 2006    Overactive bladder 2013    Pelvic pain 2010     Prediabetes 2017     Past Surgical History:   Procedure Laterality Date    APPENDECTOMY  1992    CHOLECYSTECTOMY  2006    COLONOSCOPY  2017    Normal study    COLONOSCOPY W/ BIOPSIES      COLONOSCOPY W/ POLYPECTOMY      With rectal adenoma    KNEE ARTHROSCOPY Right 2016    ACL repair    KNEE SURGERY Left 2008    ACL repair     Family History   Problem Relation Age of Onset    Diabetes type II Mother 60    No Known Problems Father     No Known Problems Sister     No Known Problems Brother     Prostate cancer Maternal Grandfather     Lung cancer Paternal Grandfather     Breast cancer Neg Hx     Ovarian cancer Neg Hx      Social History     Socioeconomic History    Marital status:    Tobacco Use    Smoking status: Former     Current packs/day: 0.00     Average packs/day: 0.1 packs/day for 10.0 years (1.0 ttl pk-yrs)     Types: Cigarettes     Start date: 1997     Quit date: 2007     Years since quittin.4    Smokeless tobacco: Never   Substance and Sexual Activity    Alcohol use: Yes     Alcohol/week: 4.0 standard drinks of alcohol     Types: 4 Glasses of wine per week     Comment: light social drinking    Drug use: No    Sexual activity: Yes     Partners: Female     Comment:        Review of Systems  Review of Systems -  General ROS: negative for - chills, fever or night sweats  Cardiovascular ROS: no chest pain or dyspnea on exertion  Gastrointestinal ROS: no abdominal pain, change in bowel habits, or black or bloody stools  Genito-Urinary ROS: no dysuria, trouble voiding, or hematuria    Objective   There were no vitals taken for this visit.  Vitals obtained from patient if available  Physical Exam  Const: Non-toxic appearing, NAD, A&Ox4, Pleasant, Cooperative  Eyes: EOMI, no conjunctivitis  ENT: No copious nasal drainage noted  Cardiac: Regular rate by pulse  Resp: Respiratory rate observed to be within normal limits, no increased work of breathing observed, no audible wheezing or  cough noted  Psych: Appropriate mood and behavior.  Procedures  Assessment & Plan   Problem List Items Addressed This Visit    None  Visit Diagnoses       Acute swimmer's ear of right side    -  Primary    Relevant Medications    ciprofloxacin-hydrocortisone (Cipro HC) 0.2-1 % otic suspension            See patient diagnoses and orders along with patient instructions for assessment, plan, and changes to care for patient.    This visit was conducted via telemedicine with live video and audio provided through Video Options: MyChart/Zoom at the point of care.    There are no Patient Instructions on file for this visit.    No follow-ups on file.    Ambulatory progress note signed and attested to by Aditya Collado D.O.

## 2024-05-15 ENCOUNTER — TELEPHONE (OUTPATIENT)
Dept: FAMILY MEDICINE CLINIC | Facility: CLINIC | Age: 49
End: 2024-05-15

## 2024-05-15 NOTE — TELEPHONE ENCOUNTER
Caller: Yony Parsons    Relationship: Self    Best call back number: [phone]    What test/procedure requested: MEDICATION    ciprofloxacin-hydrocortisone (Cipro HC) 0.2-1 % otic suspension    When is it needed: ASAP    Where is the test/procedure going to be performed: [id number] - [place], [place] - [address]  AT [address] [facility]  & [address] [facility] - [phone] [facility] [phone] FX      Additional information or concerns: PATIENT CALLED STATING INSURANCE WILL NOT PAT FOR THIS MEDICATION. A PRIOR AUTHORIZATION MAY BE REQUIRED BUT PATIENT IS OK WITH A GENERIC FORM OR A DIFFERENT MEDICATION THAT IS COVERED. PLEASE ADVISE ASAP

## 2024-05-22 NOTE — TELEPHONE ENCOUNTER
(Key: [id number]) - [id number]  Cipro HC 0.2-1% suspension  Status: PA Request  Created: May 14th, 2024 [phone]  Sent: May 22nd, 2024

## 2024-05-28 NOTE — TELEPHONE ENCOUNTER
Denied on May 27  PA Case: [id number], Status: Denied. Notification: Completed.    We denied your request because we did not see what we need to approve the drug you asked for, (Cipro HC [hydrocortisone] otic suspension). We may be able to approve this drug in a certain situation (when you have tried two other drugs first ciprofloxacin dexamethasone otic suspension; ciprofloxacin-fluocinolone otic solution; or eomycinpolymyxin-hydrocortisone otic suspension] and when one of the following has been provided: description of the nature of the inadequate response or intolerance for each formulary product, or confirmation that a completed Food and Drug Administration [FDA] MedWatch Adverse Event Reporting Form has been submitted to the FDA for each   product). We do not see that this applies to you. We based this decision on your health plan’s prior authorization clinical criteria named Non-Formulary Prescription Request.Medications that are not medically necessary are an exclusion under your plan benefits   and are not covered.    Grievances and Appeals,   [address], [place] [address].

## 2024-06-07 ENCOUNTER — TELEPHONE (OUTPATIENT)
Dept: FAMILY MEDICINE CLINIC | Facility: CLINIC | Age: 49
End: 2024-06-07
Payer: COMMERCIAL

## 2024-11-27 ENCOUNTER — OFFICE VISIT (OUTPATIENT)
Dept: FAMILY MEDICINE CLINIC | Facility: CLINIC | Age: 49
End: 2024-11-27
Payer: COMMERCIAL

## 2024-11-27 VITALS
BODY MASS INDEX: 28.23 KG/M2 | HEIGHT: 74 IN | SYSTOLIC BLOOD PRESSURE: 110 MMHG | WEIGHT: 220 LBS | OXYGEN SATURATION: 98 % | HEART RATE: 63 BPM | DIASTOLIC BLOOD PRESSURE: 74 MMHG

## 2024-11-27 DIAGNOSIS — J40 BRONCHITIS: Primary | ICD-10-CM

## 2024-11-27 LAB
EXPIRATION DATE: NORMAL
FLUAV AG UPPER RESP QL IA.RAPID: NOT DETECTED
FLUBV AG UPPER RESP QL IA.RAPID: NOT DETECTED
INTERNAL CONTROL: NORMAL
Lab: NORMAL
SARS-COV-2 AG UPPER RESP QL IA.RAPID: NOT DETECTED

## 2024-11-27 PROCEDURE — 99214 OFFICE O/P EST MOD 30 MIN: CPT | Performed by: FAMILY MEDICINE

## 2024-11-27 PROCEDURE — 87428 SARSCOV & INF VIR A&B AG IA: CPT | Performed by: FAMILY MEDICINE

## 2024-11-27 RX ORDER — AZELASTINE 1 MG/ML
SPRAY, METERED NASAL
COMMUNITY
Start: 2024-11-07

## 2024-11-27 RX ORDER — TRIAMCINOLONE ACETONIDE 55 UG/1
SPRAY, METERED NASAL
COMMUNITY
Start: 2024-11-07

## 2024-11-27 RX ORDER — AZITHROMYCIN 250 MG/1
TABLET, FILM COATED ORAL
Qty: 6 TABLET | Refills: 0 | Status: SHIPPED | OUTPATIENT
Start: 2024-11-27 | End: 2024-12-01

## 2024-11-27 NOTE — PROGRESS NOTES
Subjective   Yony Parsons is a 49 y.o. male.     Chief Complaint   Patient presents with    Illness     COUGH, RUNNY NOSE, watery eyes, chest congestion.       History of Present Illness     Yony Parsons presents today for   Chief Complaint   Patient presents with    Illness     COUGH, RUNNY NOSE, watery eyes, chest congestion.       he reports symptoms ongoing for 3 days.  he has tried over-the-counter medications with minimal improvement.  he has tried rest and fluids. he has noticed associated symptoms of congestion and cough as well as a mild subjective fever. he feels as though his symptoms are worsening. he reports some possible sick contacts but none confirmed.    The following portions of the patient's history were reviewed and updated as appropriate: allergies, current medications, past family history, past medical history, past social history, past surgical history and problem list.    Active Ambulatory Problems     Diagnosis Date Noted    Atopic rhinitis 08/16/2016    Chronic antral gastritis 08/16/2016    Gastroesophageal reflux disease 08/16/2016    Irritable bowel syndrome 08/16/2016    Disorder of prostate 08/16/2016    Preventative health care 08/18/2016    Osteoarthritis, knee 01/23/2017    Chronic anxiety 01/26/2017    Fasting hyperglycemia 05/04/2017    Shingles 05/22/2018    Neuralgia 05/22/2018    Epigastric pain 10/04/2019    Abnormal CAT scan 10/04/2019    Acute swimmer's ear of left side 10/31/2023    Chronic cough 12/05/2023     Resolved Ambulatory Problems     Diagnosis Date Noted    Abnormal electrocardiogram 08/16/2016    Serum creatinine raised 08/16/2016    Detrusor muscle hypertonia 08/16/2016    Pain in pelvis 08/16/2016    Chronic abdominal pain 10/17/2016    Chronic pain 01/23/2017    Neck pain 02/01/2017     Past Medical History:   Diagnosis Date    Allergic     Allergic rhinitis Lifelong    Anal fissure 2006    Chronic gastric erosion 2014    Colon adenomas 2007     GERD (gastroesophageal reflux disease) 2006    IBS (irritable bowel syndrome)     Overactive bladder     Pelvic pain 2010    Prediabetes      Past Surgical History:   Procedure Laterality Date    APPENDECTOMY  1992    CHOLECYSTECTOMY  2006    COLONOSCOPY  2017    Normal study    COLONOSCOPY W/ BIOPSIES      COLONOSCOPY W/ POLYPECTOMY      With rectal adenoma    KNEE ARTHROSCOPY Right 2016    ACL repair    KNEE SURGERY Left 2008    ACL repair     Family History   Problem Relation Age of Onset    Diabetes type II Mother 60    No Known Problems Father     No Known Problems Sister     No Known Problems Brother     Prostate cancer Maternal Grandfather     Lung cancer Paternal Grandfather     Breast cancer Neg Hx     Ovarian cancer Neg Hx      Social History     Socioeconomic History    Marital status:    Tobacco Use    Smoking status: Former     Current packs/day: 0.00     Average packs/day: 0.1 packs/day for 10.0 years (1.0 ttl pk-yrs)     Types: Cigarettes     Start date: 1997     Quit date: 2007     Years since quittin.9    Smokeless tobacco: Never   Vaping Use    Vaping status: Never Used   Substance and Sexual Activity    Alcohol use: Yes     Alcohol/week: 4.0 standard drinks of alcohol     Types: 4 Glasses of wine per week     Comment: light social drinking    Drug use: No    Sexual activity: Yes     Partners: Female     Comment:        Review of Systems  Review of Systems -   General ROS: positive for  - fever and malaise  negative for - hot flashes, night sweats or weight loss  ENT ROS: positive for - headaches, nasal congestion, nasal discharge, sinus pain and sore throat  negative for - epistaxis, oral lesions, tinnitus or visual changes  Respiratory ROS: positive for - cough and sputum changes  negative for - hemoptysis, pleuritic pain, shortness of breath or wheezing  Cardiovascular ROS: no chest pain or dyspnea on exertion    Objective   Blood pressure 110/74,  "pulse 63, height 188 cm (74.02\"), weight 99.8 kg (220 lb), SpO2 98%.  Nursing note reviewed  Physical Exam  Const: Mildly ill-appearing but in no acute distress, A&Ox4, Pleasant, Cooperative  Eyes: EOMI, no conjunctivitis  ENT: There is moderate nasal discharge present, nasal congestion noted.  There is slight congestion of the mucous membranes.  There is mild submandibular and anterior cervical lymphadenopathy consistent with symptoms.  Cardiac: Regular rate and rhythm, no cyanosis  Resp: Respiratory rate within normal limits, no increased work of breathing, no audible wheezing or retractions noted.  There are slight rhonchi noted, a cough is appreciated sporadically.  GI: No distention or ascites  Psych: Appropriate mood and behavior.  Skin: Warm, dry  Procedures  Assessment & Plan     Problem List Items Addressed This Visit    None  Visit Diagnoses       Bronchitis    -  Primary    Relevant Medications    azelastine (ASTELIN) 0.1 % nasal spray    Triamcinolone Acetonide (NASACORT) 55 MCG/ACT nasal inhaler    Other Relevant Orders    POCT SARS-CoV-2 Antigen DAVID + Flu (Completed)            Patient was encouraged to practice proper hygiene as well as use the symptomatic medications indicated above.  If no better they were encouraged to return to our office if needed.  Zinc lozenges may be effective in preventing the spread of viral upper respiratory infections including the common cold, and they were counseled on family member use and prophylactic use of these medications. he was encouraged to maintain adequate hydration with Pedialyte if possible.  They were cautioned on the risk of viral myocarditis, and encouraged to avoid exercise or significant exertion while febrile or while symptoms extend below the neck.    There are no Patient Instructions on file for this visit.     Ambulatory progress note signed and attested to by Aditya Collado D.O.       "

## 2025-02-07 ENCOUNTER — TELEMEDICINE (OUTPATIENT)
Dept: FAMILY MEDICINE CLINIC | Facility: CLINIC | Age: 50
End: 2025-02-07
Payer: COMMERCIAL

## 2025-02-07 DIAGNOSIS — J06.9 UPPER RESPIRATORY TRACT INFECTION, UNSPECIFIED TYPE: Primary | ICD-10-CM

## 2025-02-07 DIAGNOSIS — R05.3 PERSISTENT COUGH FOR 3 WEEKS OR LONGER: ICD-10-CM

## 2025-02-07 PROCEDURE — 99213 OFFICE O/P EST LOW 20 MIN: CPT | Performed by: STUDENT IN AN ORGANIZED HEALTH CARE EDUCATION/TRAINING PROGRAM

## 2025-02-07 NOTE — PROGRESS NOTES
Chief Complaint   Patient presents with    Cough    Nasal Congestion     Mode of Visit: My Chart Twilio Video  Location of patient: Home  Location of Provider: [facility]  Patient has chosen to receive care through a telehealth visit.  Does the patient consent to use a video/audio device for their medical care today: Yes  The visit included audio and video interaction: Yes      HPI:  Yony Parsons is a 50 y.o. male who presents today for cough.     Pt states that in November he was treated for bronchitis with Azithromycin and Prednisone. Symptoms really never improved with this therapy.Finally went to [facility] in January and was given rx for Doxycycline x 10 days. He did feel near complete resolution in symptoms after finishing this treatment, but after 1-2 weeks symptoms gradually began to return. Unsure if he's dealing with a new infection at this point or not.Symptoms are similar with sinus pressure, congestion, deep persistent and productive cough that has now been ongoing for >3 weeks. He is following w Allergist and receiving weekly shots. Also uses Nasacort, Singulair and Flovent inhaler. Denies fever, chills.      PE:  Unable to obtain vitals due to telehealth visit.     Gen Appearance: NAD  HEENT: Normocephalic, EOMI  Respiratory: Coughing intermittently during exam  Neuro: Alert, oriented, normal speech      Current Outpatient Medications   Medication Sig Dispense Refill    albuterol sulfate  (90 Base) MCG/ACT inhaler INHALE 2 PUFFS BY MOUTH EVERY 4 HOURS AS NEEDED FOR WHEEZING OR SHORTNESS OF BREATH 8.5 g 0    amoxicillin-clavulanate (AUGMENTIN) 875-125 MG per tablet Take 1 tablet by mouth 2 (Two) Times a Day for 10 days. 20 tablet 0    azelastine (ASTELIN) 0.1 % nasal spray USE 1 SPRAY IN EACH NOSTRIL EVERY EVENING      calcium carbonate (TUMS) 500 MG chewable tablet Chew 1 tablet As Needed.      ciprofloxacin-hydrocortisone (Cipro HC) 0.2-1 % otic suspension Administer 3 drops into ear(s) as  directed by provider 2 (Two) Times a Day. 10 mL 0    Dexilant 60 MG capsule Take 1 capsule by mouth Daily. 90 capsule 3    diclofenac (VOLTAREN) 1 % gel gel apply 2 grams to affected area ON UPPER EXTREMITIES four times a ...  (REFER TO PRESCRIPTION NOTES).  0    Flovent  MCG/ACT inhaler Inhale 1 puff 2 (Two) Times a Day. 12 g 1    montelukast (SINGULAIR) 10 MG tablet TAKE 1 TABLET BY MOUTH EVERY NIGHT 90 tablet 1    Multiple Vitamins-Minerals (MENS MULTI VITAMIN & MINERAL PO) Take 1 tablet by mouth Every Morning.      Triamcinolone Acetonide (NASACORT) 55 MCG/ACT nasal inhaler USE 1 SPRAY IN EACH NOSTRIL EVERY MORNING       No current facility-administered medications for this visit.        A/P:  Diagnoses and all orders for this visit:    1. Upper respiratory tract infection, unspecified type (Primary)    2. Persistent cough for 3 weeks or longer  -     amoxicillin-clavulanate (AUGMENTIN) 875-125 MG per tablet; Take 1 tablet by mouth 2 (Two) Times a Day for 10 days.  Dispense: 20 tablet; Refill: 0       Symptoms now present x 2-3 weeks. Unclear is this is recurrent or new infection.   Given recent atypical coverage will trial Augmentin x 10 days instead  Cont to treat congestion/allergy symptoms w Singulair, nasal steroidal spray and inhalers  FU if sx's fail to improve        Dictated Utilizing Dragon Dictation    Please note that portions of this note were completed with a voice recognition program.    Part of this note may be an electronic transcription/translation of spoken language to printed text using the Dragon Dictation System.

## 2025-03-10 DIAGNOSIS — K21.9 GASTROESOPHAGEAL REFLUX DISEASE WITHOUT ESOPHAGITIS: ICD-10-CM

## 2025-03-10 RX ORDER — DEXLANSOPRAZOLE 60 MG/1
1 CAPSULE, DELAYED RELEASE ORAL DAILY
Qty: 90 CAPSULE | Refills: 3 | Status: CANCELLED | OUTPATIENT
Start: 2025-03-10

## 2025-03-12 ENCOUNTER — TELEPHONE (OUTPATIENT)
Dept: FAMILY MEDICINE CLINIC | Facility: CLINIC | Age: 50
End: 2025-03-12
Payer: COMMERCIAL

## 2025-03-12 NOTE — TELEPHONE ENCOUNTER
Caller: Yony Parsons    Relationship to patient: Self      Best call back number: [phone]    Provider: DR PRICE    Medication PA needed: Dexilant 60 MG capsule     Reason for call/Prior Auth: INSURANCE REQUESTING THE YEARLY PRIOR AUTHORIZATION ON DEXILANT 60 MG. PATIENT SENT A MESSAGE 3/10/25 TO ADVISE OF THE NEED FOR THE PRIOR AUTHORIZATION BUT HAD NOT HEARD BACK YET. PATIENT STATES THEY HAVE ENOUGH MEDICATION TO GET THEM THROUGH THE WEEKEND. PLEASE CALL TO DISCUSS.

## 2025-03-13 ENCOUNTER — PRIOR AUTHORIZATION (OUTPATIENT)
Dept: FAMILY MEDICINE CLINIC | Facility: CLINIC | Age: 50
End: 2025-03-13
Payer: COMMERCIAL

## 2025-03-13 NOTE — TELEPHONE ENCOUNTER
(Key: [id number])    Drug  Dexilant 60MG dr capsules  ePA cloud logo  Form  [place] [employer] Electronic PA Form (2017 [employer])

## 2025-03-13 NOTE — TELEPHONE ENCOUNTER
Outcome  Approved today by [employer] 2017  PA Case: [id number], Status: Approved, Coverage Starts on: 3/13/2025 12:00:00 AM, Coverage Ends on: 3/13/2026 12:00:00 AM.  Effective Date: 3/13/2025

## 2025-03-14 DIAGNOSIS — K21.9 GASTROESOPHAGEAL REFLUX DISEASE WITHOUT ESOPHAGITIS: ICD-10-CM

## 2025-03-14 NOTE — TELEPHONE ENCOUNTER
Caller: Yony Parsons Mason    Relationship: Self    Best call back number: [phone]     Requested Prescriptions:   Requested Prescriptions     Pending Prescriptions Disp Refills    Dexilant 60 MG capsule 90 capsule 3     Sig: Take 1 capsule by mouth Daily.        Pharmacy where request should be sent: [id number] - [place], [place] - [address]  AT [facility]  & [address] [facility] - [phone] [facility] [phone] FX     Last office visit with prescribing clinician: 11/27/2024   Last telemedicine visit with prescribing clinician: 2/7/2025   Next office visit with prescribing clinician: 3/18/2025     Additional details provided by patient: PATIENT HAS TWO DAYS LEFT    Does the patient have less than a 3 day supply:  [x] Yes  [] No    Would you like a call back once the refill request has been completed: [] Yes [x] No    If the office needs to give you a call back, can they leave a voicemail: [x] Yes [] No    Brandon Jaime Rep   03/14/25 13:05 EDT

## 2025-03-17 RX ORDER — DEXLANSOPRAZOLE 60 MG/1
1 CAPSULE, DELAYED RELEASE ORAL DAILY
Qty: 90 CAPSULE | Refills: 0 | Status: SHIPPED | OUTPATIENT
Start: 2025-03-17 | End: 2025-03-20 | Stop reason: SDUPTHER

## 2025-03-17 NOTE — TELEPHONE ENCOUNTER
PT CALLED AND PER PHARMACY THE PA IS STILL NOT APPROVED. PLEASE CALL PHARMACY AND LET THEM KNOW IT WAS APPROVED

## 2025-03-18 ENCOUNTER — OFFICE VISIT (OUTPATIENT)
Dept: FAMILY MEDICINE CLINIC | Facility: CLINIC | Age: 50
End: 2025-03-18
Payer: COMMERCIAL

## 2025-03-18 VITALS
WEIGHT: 220.2 LBS | HEIGHT: 74 IN | DIASTOLIC BLOOD PRESSURE: 84 MMHG | SYSTOLIC BLOOD PRESSURE: 118 MMHG | OXYGEN SATURATION: 97 % | BODY MASS INDEX: 28.26 KG/M2 | HEART RATE: 79 BPM

## 2025-03-18 DIAGNOSIS — Z13.29 SCREENING FOR ENDOCRINE DISORDER: ICD-10-CM

## 2025-03-18 DIAGNOSIS — K21.9 GASTROESOPHAGEAL REFLUX DISEASE WITHOUT ESOPHAGITIS: Primary | ICD-10-CM

## 2025-03-18 DIAGNOSIS — Z00.00 PREVENTATIVE HEALTH CARE: ICD-10-CM

## 2025-03-18 DIAGNOSIS — Z13.89 SCREENING FOR BLOOD OR PROTEIN IN URINE: ICD-10-CM

## 2025-03-18 DIAGNOSIS — R05.3 CHRONIC COUGH: ICD-10-CM

## 2025-03-18 DIAGNOSIS — Z13.220 SCREENING FOR HYPERLIPIDEMIA: ICD-10-CM

## 2025-03-18 DIAGNOSIS — Z13.6 SCREENING FOR CARDIOVASCULAR CONDITION: ICD-10-CM

## 2025-03-18 DIAGNOSIS — Z13.0 SCREENING FOR DEFICIENCY ANEMIA: ICD-10-CM

## 2025-03-18 DIAGNOSIS — J30.2 SEASONAL ALLERGIC RHINITIS, UNSPECIFIED TRIGGER: ICD-10-CM

## 2025-03-18 DIAGNOSIS — Z12.5 SCREENING PSA (PROSTATE SPECIFIC ANTIGEN): ICD-10-CM

## 2025-03-18 RX ORDER — MONTELUKAST SODIUM 10 MG/1
10 TABLET ORAL NIGHTLY
Qty: 90 TABLET | Refills: 1 | Status: SHIPPED | OUTPATIENT
Start: 2025-03-18

## 2025-03-18 RX ORDER — ALBUTEROL SULFATE 90 UG/1
2 INHALANT RESPIRATORY (INHALATION) EVERY 4 HOURS PRN
Qty: 8.5 G | Refills: 0 | Status: SHIPPED | OUTPATIENT
Start: 2025-03-18

## 2025-03-18 RX ORDER — DEXAMETHASONE 4 MG/1
1 TABLET ORAL 2 TIMES DAILY
Qty: 12 G | Refills: 1 | Status: SHIPPED | OUTPATIENT
Start: 2025-03-18 | End: 2025-03-20 | Stop reason: CLARIF

## 2025-03-18 NOTE — PATIENT INSTRUCTIONS
If cough persists despite inhaler, allergy shots, dymista, season change let us know and we can get a chest CT

## 2025-03-18 NOTE — PROGRESS NOTES
Established Patient Office Visit      Patient Name: Yony Parsons  : 1975   MRN: [id number]   Care Team: Patient Care Team:  Aditya Collado DO as PCP - General (Family Medicine)    Chief Complaint:    Chief Complaint   Patient presents with    Allergies     Wanted to see if allergy shot could cause him to cough       History of Present Illness: Yony Parsons is a 50 y.o. male who is here today for chief complaint.    Allergies  Symptoms include cough.    Pertinent negative symptoms include no chest pain, no chills, no fever, no headaches and no myalgias.   Cough  This is a chronic problem. The current episode started more than 1 month ago. The problem has been coming and going. The problem occurs intermittent. The cough is Productive of clear sputum. Associated symptoms include nasal congestion, postnasal drip and rhinorrhea. Pertinent negatives include no chest pain, chills, ear congestion, ear pain, fever, headaches, heartburn, hemoptysis or myalgias. The symptoms are aggravated by pollens.       Has been coughing over the last few weeks, slowly getting better  Finally started allergy shots last Fall    This patient is accompanied by their self who contributes to the history of their care.    The following portions of the patient's history were reviewed and updated as appropriate: allergies, current medications, past family history, past medical history, past social history, past surgical history and problem list.    Subjective      Review of Systems:   Review of Systems   Constitutional:  Negative for chills and fever.   HENT:  Positive for postnasal drip and rhinorrhea. Negative for ear pain.    Respiratory:  Positive for cough. Negative for hemoptysis.    Cardiovascular:  Negative for chest pain.   Musculoskeletal:  Negative for myalgias.    - See HPI    Past Medical History:   Past Medical History:   Diagnosis Date    Allergic     seasonal allergies    Allergic rhinitis Lifelong     especially fall    Anal fissure 2006    Chronic gastric erosion     EGD positive for gastritis and esophagitis    Colon adenomas 2007    With rectal adenoma    Colon polyp 2007    polyps removed    GERD (gastroesophageal reflux disease) 2006    Severe refractory    IBS (irritable bowel syndrome)     Overactive bladder     Pelvic pain     Negative urologic workup    Prediabetes 2017    Hemoglobin A1c 6.2    Preventative health care 2016    Shingles 2018    Right T10 and T12 dermatomes       Past Surgical History:   Past Surgical History:   Procedure Laterality Date    APPENDECTOMY  1992    CHOLECYSTECTOMY      COLONOSCOPY      Normal study    COLONOSCOPY W/ BIOPSIES      COLONOSCOPY W/ POLYPECTOMY  2007    With rectal adenoma    KNEE ARTHROSCOPY Right 2016    ACL repair    KNEE SURGERY Left 2008    ACL repair    UPPER GASTROINTESTINAL ENDOSCOPY  ,        Family History:   Family History   Problem Relation Age of Onset    Diabetes type II Mother 60    No Known Problems Father     No Known Problems Sister     No Known Problems Brother     Prostate cancer Maternal Grandfather     Lung cancer Paternal Grandfather     Breast cancer Neg Hx     Ovarian cancer Neg Hx        Social History:   Social History     Socioeconomic History    Marital status:    Tobacco Use    Smoking status: Former     Current packs/day: 0.00     Average packs/day: 0.1 packs/day for 10.0 years (1.0 ttl pk-yrs)     Types: Cigarettes     Start date: 1997     Quit date: 2007     Years since quittin.2    Smokeless tobacco: Never   Vaping Use    Vaping status: Never Used   Substance and Sexual Activity    Alcohol use: Yes     Alcohol/week: 4.0 standard drinks of alcohol     Types: 4 Glasses of wine per week     Comment: light social drinking    Drug use: No    Sexual activity: Yes     Partners: Female     Comment:        Tobacco History:   Social History     Tobacco Use   Smoking  "Status Former    Current packs/day: 0.00    Average packs/day: 0.1 packs/day for 10.0 years (1.0 ttl pk-yrs)    Types: Cigarettes    Start date: 1997    Quit date: 2007    Years since quittin.2   Smokeless Tobacco Never       Medications:   Outpatient Medications Prior to Visit   Medication Sig Dispense Refill    azelastine (ASTELIN) 0.1 % nasal spray USE 1 SPRAY IN EACH NOSTRIL EVERY EVENING      calcium carbonate (TUMS) 500 MG chewable tablet Chew 1 tablet As Needed.      Dexilant 60 MG capsule Take 1 capsule by mouth Daily. 90 capsule 0    diclofenac (VOLTAREN) 1 % gel gel apply 2 grams to affected area ON UPPER EXTREMITIES four times a ...  (REFER TO PRESCRIPTION NOTES).  0    Multiple Vitamins-Minerals (MENS MULTI VITAMIN & MINERAL PO) Take 1 tablet by mouth Every Morning.      Triamcinolone Acetonide (NASACORT) 55 MCG/ACT nasal inhaler USE 1 SPRAY IN EACH NOSTRIL EVERY MORNING      albuterol sulfate  (90 Base) MCG/ACT inhaler INHALE 2 PUFFS BY MOUTH EVERY 4 HOURS AS NEEDED FOR WHEEZING OR SHORTNESS OF BREATH 8.5 g 0    ciprofloxacin-hydrocortisone (Cipro HC) 0.2-1 % otic suspension Administer 3 drops into ear(s) as directed by provider 2 (Two) Times a Day. (Patient not taking: Reported on 3/18/2025) 10 mL 0    Flovent  MCG/ACT inhaler Inhale 1 puff 2 (Two) Times a Day. (Patient not taking: Reported on 3/18/2025) 12 g 1    montelukast (SINGULAIR) 10 MG tablet TAKE 1 TABLET BY MOUTH EVERY NIGHT (Patient not taking: Reported on 3/18/2025) 90 tablet 1     No facility-administered medications prior to visit.        Allergies:   Allergies   Allergen Reactions    No Known Drug Allergy        Objective   Objective     Physical Exam:  Vital Signs:   Vitals:    25 1344   BP: 118/84   Pulse: 79   SpO2: 97%   Weight: 99.9 kg (220 lb 3.2 oz)   Height: 188 cm (74.02\")     Body mass index is 28.26 kg/m².     Physical Exam  Nursing note reviewed  Const: NAD, A&Ox4, Pleasant, Cooperative  Eyes: " EOMI, no conjunctivitis  ENT: No nasal discharge present, neck supple  Cardiac: Regular rate and rhythm, no cyanosis  Resp: Respiratory rate within normal limits, no increased work of breathing, no audible wheezing or retractions noted  GI: No distention or ascites  MSK: Motor and sensation grossly intact in bilateral upper extremities  Neurologic: CN II-XII grossly intact  Psych: Appropriate mood and behavior.  Skin: Warm, dry  Procedures/Radiology     Procedures  No radiology results for the last 7 days     Assessment & Plan   Assessment / Plan      Assessment/Plan:   Problems Addressed This Visit  Diagnoses and all orders for this visit:    1. Gastroesophageal reflux disease without esophagitis (Primary)    2. Chronic cough  -     Flovent  MCG/ACT inhaler; Inhale 1 puff 2 (Two) Times a Day.  Dispense: 12 g; Refill: 1  -     montelukast (SINGULAIR) 10 MG tablet; Take 1 tablet by mouth Every Night.  Dispense: 90 tablet; Refill: 1  -     albuterol sulfate  (90 Base) MCG/ACT inhaler; Inhale 2 puffs Every 4 (Four) Hours As Needed for Wheezing or Shortness of Air.  Dispense: 8.5 g; Refill: 0    3. Seasonal allergic rhinitis, unspecified trigger  -     Flovent  MCG/ACT inhaler; Inhale 1 puff 2 (Two) Times a Day.  Dispense: 12 g; Refill: 1  -     montelukast (SINGULAIR) 10 MG tablet; Take 1 tablet by mouth Every Night.  Dispense: 90 tablet; Refill: 1  -     albuterol sulfate  (90 Base) MCG/ACT inhaler; Inhale 2 puffs Every 4 (Four) Hours As Needed for Wheezing or Shortness of Air.  Dispense: 8.5 g; Refill: 0    4. Screening for hyperlipidemia  -     Lipid Panel; Future    5. Preventative health care  -     Lipid Panel; Future  -     High Sensitivity CRP; Future  -     Hemoglobin A1c; Future  -     Comprehensive Metabolic Panel; Future  -     CBC & Differential; Future  -     Urinalysis With Microscopic If Indicated (No Culture) - Urine, Clean Catch; Future  -     TSH Rfx On Abnormal To Free  T4; Future  -     PSA Screen; Future    6. Screening for cardiovascular condition  -     High Sensitivity CRP; Future    7. Screening for endocrine disorder  -     Hemoglobin A1c; Future  -     Comprehensive Metabolic Panel; Future  -     TSH Rfx On Abnormal To Free T4; Future    8. Screening for deficiency anemia  -     CBC & Differential; Future    9. Screening for blood or protein in urine  -     Urinalysis With Microscopic If Indicated (No Culture) - Urine, Clean Catch; Future    10. Screening PSA (prostate specific antigen)  -     PSA Screen; Future      Problem List Items Addressed This Visit          Allergies and Adverse Reactions    Atopic rhinitis    Relevant Medications    Flovent  MCG/ACT inhaler    montelukast (SINGULAIR) 10 MG tablet    albuterol sulfate  (90 Base) MCG/ACT inhaler       Gastrointestinal Abdominal     Gastroesophageal reflux disease - Primary       Health Encounters    Preventative health care    Relevant Orders    Lipid Panel    High Sensitivity CRP    Hemoglobin A1c    Comprehensive Metabolic Panel    CBC & Differential    Urinalysis With Microscopic If Indicated (No Culture) - Urine, Clean Catch    TSH Rfx On Abnormal To Free T4    PSA Screen       Pulmonary and Pneumonias    Chronic cough    Relevant Medications    Flovent  MCG/ACT inhaler    montelukast (SINGULAIR) 10 MG tablet    albuterol sulfate  (90 Base) MCG/ACT inhaler     Other Visit Diagnoses         Screening for hyperlipidemia        Relevant Orders    Lipid Panel      Screening for cardiovascular condition        Relevant Orders    High Sensitivity CRP      Screening for endocrine disorder        Relevant Orders    Hemoglobin A1c    Comprehensive Metabolic Panel    TSH Rfx On Abnormal To Free T4      Screening for deficiency anemia        Relevant Orders    CBC & Differential      Screening for blood or protein in urine        Relevant Orders    Urinalysis With Microscopic If Indicated (No  Culture) - Urine, Clean Catch      Screening PSA (prostate specific antigen)        Relevant Orders    PSA Screen          If cough persists despite inhaler, allergy shots, dymista, season change let us know and we can get a chest CT    Patient Instructions   If cough persists despite inhaler, allergy shots, dymista, season change let us know and we can get a chest CT    Follow Up:   Return for Annual.        DO JOSE JUAN Ortiz RD  [facility]  [address]  [facility] [address]  Fax [phone]  Phone [phone]    Disclaimer to patients: The 21st Century Cares Act makes medical notes like these available to patients in the interest of transparency. However, please be advised that this is still a medical document. It is intended as gtwu-hu-odko communication. Many sections may include medical language or jargon, abbreviations, and additional verbiage that are unfamiliar or confusing. In some ways it may come across as blunt, direct, or may be summarized in order to clearly and concisely communicate the most crucial information to medical professionals. It may also include mentions of conditions that are unlikely but considered as part of the differential diagnosis, including serious disorders. These are not always discussed at length at the time of appointment because their likelihood is so low, but may be included in a medical note to make it clear what has been considered and/or ruled out as part of a work-up. Medical documents are intended to carry relevant information, facts as evident, and the personal clinical opinion of the physician. If you have any questions regarding this medical document, please bring them to the attention of the physician at your next scheduled appointment.

## 2025-03-19 ENCOUNTER — TELEPHONE (OUTPATIENT)
Dept: FAMILY MEDICINE CLINIC | Facility: CLINIC | Age: 50
End: 2025-03-19
Payer: COMMERCIAL

## 2025-03-19 NOTE — TELEPHONE ENCOUNTER
Caller: Yony Parsons    Relationship: Self    Best call back number: [phone]    Which medication are you concerned about: PT CALLED STATED THAT RX    Dexilant 60 MG capsule    NEEDS A PA, FOR THE BRAND NAME, LOOKS LIKE PA WAS PREVIOUSLY PUT IN FOR GENERIC

## 2025-03-20 DIAGNOSIS — J30.2 SEASONAL ALLERGIC RHINITIS, UNSPECIFIED TRIGGER: ICD-10-CM

## 2025-03-20 DIAGNOSIS — R05.3 CHRONIC COUGH: ICD-10-CM

## 2025-03-20 DIAGNOSIS — K21.9 GASTROESOPHAGEAL REFLUX DISEASE WITHOUT ESOPHAGITIS: ICD-10-CM

## 2025-03-20 RX ORDER — DEXLANSOPRAZOLE 60 MG/1
1 CAPSULE, DELAYED RELEASE ORAL DAILY
Qty: 90 CAPSULE | Refills: 0 | Status: SHIPPED | OUTPATIENT
Start: 2025-03-20

## 2025-03-20 RX ORDER — FLUTICASONE PROPIONATE 110 UG/1
1 AEROSOL, METERED RESPIRATORY (INHALATION)
Qty: 1 EACH | Refills: 11 | Status: SHIPPED | OUTPATIENT
Start: 2025-03-20

## 2025-03-20 RX ORDER — DEXAMETHASONE 4 MG/1
1 TABLET ORAL 2 TIMES DAILY
Qty: 12 G | Refills: 1 | Status: CANCELLED | OUTPATIENT
Start: 2025-03-20

## 2025-03-20 NOTE — TELEPHONE ENCOUNTER
Rx Refill Note  Requested Prescriptions      No prescriptions requested or ordered in this encounter      Last office visit with prescribing clinician: 3/18/2025   Last telemedicine visit with prescribing clinician: 2/7/2025   Next office visit with prescribing clinician: 5/29/2025     Tamara Haas MA  03/20/25, 09:30 EDT

## 2025-03-21 ENCOUNTER — PATIENT MESSAGE (OUTPATIENT)
Dept: FAMILY MEDICINE CLINIC | Facility: CLINIC | Age: 50
End: 2025-03-21
Payer: COMMERCIAL

## 2025-03-26 ENCOUNTER — PRIOR AUTHORIZATION (OUTPATIENT)
Dept: FAMILY MEDICINE CLINIC | Facility: CLINIC | Age: 50
End: 2025-03-26
Payer: COMMERCIAL

## 2025-03-26 NOTE — TELEPHONE ENCOUNTER
(Key: [id number])    Started new PA for dexilant that pharmacy sent over.  Outcome  Additional Information Required  Available without authorization.      Will fax over approval sheet.

## 2025-04-04 RX ORDER — FLUTICASONE PROPIONATE 110 UG/1
1 AEROSOL, METERED RESPIRATORY (INHALATION)
Qty: 1 EACH | Refills: 11 | Status: SHIPPED | OUTPATIENT
Start: 2025-04-04

## 2025-05-21 ENCOUNTER — LAB (OUTPATIENT)
Dept: LAB | Facility: HOSPITAL | Age: 50
End: 2025-05-21
Payer: COMMERCIAL

## 2025-05-21 DIAGNOSIS — Z00.00 PREVENTATIVE HEALTH CARE: ICD-10-CM

## 2025-05-21 DIAGNOSIS — Z12.5 SCREENING PSA (PROSTATE SPECIFIC ANTIGEN): ICD-10-CM

## 2025-05-21 DIAGNOSIS — Z13.29 SCREENING FOR ENDOCRINE DISORDER: ICD-10-CM

## 2025-05-21 DIAGNOSIS — Z13.6 SCREENING FOR CARDIOVASCULAR CONDITION: ICD-10-CM

## 2025-05-21 DIAGNOSIS — Z13.89 SCREENING FOR BLOOD OR PROTEIN IN URINE: ICD-10-CM

## 2025-05-21 DIAGNOSIS — Z13.220 SCREENING FOR HYPERLIPIDEMIA: ICD-10-CM

## 2025-05-21 DIAGNOSIS — Z13.0 SCREENING FOR DEFICIENCY ANEMIA: ICD-10-CM

## 2025-05-21 LAB
ALBUMIN SERPL-MCNC: 4.3 G/DL (ref 3.5–5.2)
ALBUMIN/GLOB SERPL: 1.6 G/DL
ALP SERPL-CCNC: 50 U/L (ref 39–117)
ALT SERPL W P-5'-P-CCNC: 28 U/L (ref 1–41)
ANION GAP SERPL CALCULATED.3IONS-SCNC: 10 MMOL/L (ref 5–15)
AST SERPL-CCNC: 28 U/L (ref 1–40)
BASOPHILS # BLD AUTO: 0.04 10*3/MM3 (ref 0–0.2)
BASOPHILS NFR BLD AUTO: 0.6 % (ref 0–1.5)
BILIRUB SERPL-MCNC: 0.5 MG/DL (ref 0–1.2)
BILIRUB UR QL STRIP: NEGATIVE
BUN SERPL-MCNC: 14 MG/DL (ref 6–20)
BUN/CREAT SERPL: 12.6 (ref 7–25)
CALCIUM SPEC-SCNC: 9.3 MG/DL (ref 8.6–10.5)
CHLORIDE SERPL-SCNC: 105 MMOL/L (ref 98–107)
CHOLEST SERPL-MCNC: 174 MG/DL (ref 0–200)
CLARITY UR: CLEAR
CO2 SERPL-SCNC: 25 MMOL/L (ref 22–29)
COLOR UR: YELLOW
CREAT SERPL-MCNC: 1.11 MG/DL (ref 0.76–1.27)
CRP SERPL-MCNC: 0.29 MG/DL (ref 0.01–0.5)
DEPRECATED RDW RBC AUTO: 43.4 FL (ref 37–54)
EGFRCR SERPLBLD CKD-EPI 2021: 80.4 ML/MIN/1.73
EOSINOPHIL # BLD AUTO: 0.05 10*3/MM3 (ref 0–0.4)
EOSINOPHIL NFR BLD AUTO: 0.7 % (ref 0.3–6.2)
ERYTHROCYTE [DISTWIDTH] IN BLOOD BY AUTOMATED COUNT: 12.8 % (ref 12.3–15.4)
GLOBULIN UR ELPH-MCNC: 2.7 GM/DL
GLUCOSE SERPL-MCNC: 110 MG/DL (ref 65–99)
GLUCOSE UR STRIP-MCNC: NEGATIVE MG/DL
HBA1C MFR BLD: 5.9 % (ref 4.8–5.6)
HCT VFR BLD AUTO: 43.5 % (ref 37.5–51)
HDLC SERPL-MCNC: 52 MG/DL (ref 40–60)
HGB BLD-MCNC: 14.9 G/DL (ref 13–17.7)
HGB UR QL STRIP.AUTO: NEGATIVE
IMM GRANULOCYTES # BLD AUTO: 0.02 10*3/MM3 (ref 0–0.05)
IMM GRANULOCYTES NFR BLD AUTO: 0.3 % (ref 0–0.5)
KETONES UR QL STRIP: NEGATIVE
LDLC SERPL CALC-MCNC: 110 MG/DL (ref 0–100)
LDLC/HDLC SERPL: 2.1 {RATIO}
LEUKOCYTE ESTERASE UR QL STRIP.AUTO: NEGATIVE
LYMPHOCYTES # BLD AUTO: 3.33 10*3/MM3 (ref 0.7–3.1)
LYMPHOCYTES NFR BLD AUTO: 47 % (ref 19.6–45.3)
MCH RBC QN AUTO: 31.5 PG (ref 26.6–33)
MCHC RBC AUTO-ENTMCNC: 34.3 G/DL (ref 31.5–35.7)
MCV RBC AUTO: 92 FL (ref 79–97)
MONOCYTES # BLD AUTO: 0.59 10*3/MM3 (ref 0.1–0.9)
MONOCYTES NFR BLD AUTO: 8.3 % (ref 5–12)
NEUTROPHILS NFR BLD AUTO: 3.06 10*3/MM3 (ref 1.7–7)
NEUTROPHILS NFR BLD AUTO: 43.1 % (ref 42.7–76)
NITRITE UR QL STRIP: NEGATIVE
NRBC BLD AUTO-RTO: 0 /100 WBC (ref 0–0.2)
PH UR STRIP.AUTO: 7 [PH] (ref 5–8)
PLATELET # BLD AUTO: 224 10*3/MM3 (ref 140–450)
PMV BLD AUTO: 10.8 FL (ref 6–12)
POTASSIUM SERPL-SCNC: 4.7 MMOL/L (ref 3.5–5.2)
PROT SERPL-MCNC: 7 G/DL (ref 6–8.5)
PROT UR QL STRIP: NEGATIVE
PSA SERPL-MCNC: 1.09 NG/ML (ref 0–4)
RBC # BLD AUTO: 4.73 10*6/MM3 (ref 4.14–5.8)
SODIUM SERPL-SCNC: 140 MMOL/L (ref 136–145)
SP GR UR STRIP: 1.02 (ref 1–1.03)
TRIGL SERPL-MCNC: 65 MG/DL (ref 0–150)
TSH SERPL DL<=0.05 MIU/L-ACNC: 2.23 UIU/ML (ref 0.27–4.2)
UROBILINOGEN UR QL STRIP: NORMAL
VLDLC SERPL-MCNC: 12 MG/DL (ref 5–40)
WBC NRBC COR # BLD AUTO: 7.09 10*3/MM3 (ref 3.4–10.8)

## 2025-05-21 PROCEDURE — 81003 URINALYSIS AUTO W/O SCOPE: CPT

## 2025-05-21 PROCEDURE — 83036 HEMOGLOBIN GLYCOSYLATED A1C: CPT

## 2025-05-21 PROCEDURE — 86141 C-REACTIVE PROTEIN HS: CPT

## 2025-05-21 PROCEDURE — 80050 GENERAL HEALTH PANEL: CPT

## 2025-05-21 PROCEDURE — G0103 PSA SCREENING: HCPCS

## 2025-05-21 PROCEDURE — 80061 LIPID PANEL: CPT

## 2025-05-29 ENCOUNTER — OFFICE VISIT (OUTPATIENT)
Dept: FAMILY MEDICINE CLINIC | Facility: CLINIC | Age: 50
End: 2025-05-29
Payer: COMMERCIAL

## 2025-05-29 VITALS
WEIGHT: 219.6 LBS | SYSTOLIC BLOOD PRESSURE: 118 MMHG | HEART RATE: 82 BPM | DIASTOLIC BLOOD PRESSURE: 82 MMHG | BODY MASS INDEX: 28.18 KG/M2 | HEIGHT: 74 IN | OXYGEN SATURATION: 100 %

## 2025-05-29 DIAGNOSIS — Z00.00 PREVENTATIVE HEALTH CARE: Primary | ICD-10-CM

## 2025-05-29 DIAGNOSIS — Z13.0 SCREENING FOR DEFICIENCY ANEMIA: ICD-10-CM

## 2025-05-29 DIAGNOSIS — R73.01 FASTING HYPERGLYCEMIA: ICD-10-CM

## 2025-05-29 DIAGNOSIS — K21.9 GASTROESOPHAGEAL REFLUX DISEASE WITHOUT ESOPHAGITIS: ICD-10-CM

## 2025-05-29 DIAGNOSIS — Z13.29 SCREENING FOR ENDOCRINE DISORDER: ICD-10-CM

## 2025-05-29 DIAGNOSIS — Z13.89 SCREENING FOR BLOOD OR PROTEIN IN URINE: ICD-10-CM

## 2025-05-29 DIAGNOSIS — E56.9 VITAMIN DEFICIENCY: ICD-10-CM

## 2025-05-29 DIAGNOSIS — Z13.6 SCREENING FOR CARDIOVASCULAR CONDITION: ICD-10-CM

## 2025-05-29 DIAGNOSIS — Z13.220 SCREENING FOR HYPERLIPIDEMIA: ICD-10-CM

## 2025-05-29 PROCEDURE — 99396 PREV VISIT EST AGE 40-64: CPT | Performed by: FAMILY MEDICINE

## 2025-05-29 NOTE — PATIENT INSTRUCTIONS
I would like you to have your labs done about a week prior to your next appointment.  You do not need an appointment, but I would advise to call our office a few days before you plan to have your labs done to confirm that orders are in and active.  We will discuss the results at your next scheduled visit.  -Lab services are available at any [facility] outpatient lab center, including across the street at [address] [place] [address] Dr     Your labs should be done when you're in a fasting state.  This means you should not have anything with calories for at least 10 hours prior to the blood draw. Water and black coffee are fine. Both blood sugar and triglyceride levels rise after meals, and this may impact your results. Hormone levels can also change, so unless you are specifically told you do not have to fast for labs, it is better to be fasting.    Additionally, stop any multivitamins or B vitamins (especially biotin) at least 48 hours before your labs since these can interfere with accurate measurement.

## 2025-05-29 NOTE — PROGRESS NOTES
Annual Well Adult Visit     Patient Name: Yony Parsons  : 1975   MRN: [id number]   Care Team: Patient Care Team:  Aditya Collado DO as PCP - General (Family Medicine)    Chief Complaint:    Chief Complaint   Patient presents with    Annual Exam       HPI    History of Present Illness: Yony Parsons is a 50 y.o. male who presents today for annual physical exam and preventative care. He had labs completed last week. He has been doing allergy shots this past year, allergies have actually done remarkably well this year as a result.    Recent Hospitalizations:  He was not admitted to the hospital during the last year.     The following portions of the patient's history were reviewed and updated as appropriate: allergies, current medications, past family history, past medical history, past social history, past surgical history and problem list.       Allied Screenings    N/A         Date      Eye Exam   No Vision Impairment and Eye Exam Up To Date       []              []   Up to date    Location:   []   Recommended         Counseled every 2 years in those without known issues, yearly if wearing glasses or contacts        Dental Exam   Dentition: good    []           []   Up to date   Location:   []   Recommended       Counseled, recommended cleanings every 6 months, daily brushing and flossing       Obesity Counseling  Body mass index is 28.18 kg/m².       []        []   Complete   []   Nutritionist referral   []   Declined   Counseled on moderate portions, low meat diet focusing on whole foods and plant-based protein          Additional Testing         Date      Colorectal Screening   Risk: average      Completed         Next due          PSA  (Over age 50)      N/A      Lab Results   Component Value Date    PSA 1.090 2025    PSA 0.70 10/15/2014           US Aorta  (For male with >20 cigarette history, age 65)    N/A    Date:     Where:      CT for Smoker  (Age 55-75, 30 pk yr)     N/A    Date:     Where:      Hep. C    N/A      Hep C Virus Ab   Date Value Ref Range Status   10/17/2016 <0.1 0.0 - 0.9 s/co ratio Final     Comment:                                       Negative:     < 0.8                               Indeterminate: 0.8 - 0.9                                    Positive:     > 0.9   The CDC recommends that a positive HCV antibody result   be followed up with a HCV Nucleic Acid Amplification   test (855286).              HTN screening  BP Readings from Last 1 Encounters:   05/29/25 118/82           []   On BP medication   []   Lifestyle measures   []   Normotensive          Health Maintenance Summary            Current Care Gaps       ANNUAL PHYSICAL (Yearly) Overdue since 6/28/2024 06/28/2023  Registry Metric: Last Annual Physical              COVID-19 Vaccine (4 - 2024-25 season) Overdue since 9/1/2024 11/27/2024  Postponed until 2/16/2025 by Komal Levine MA (Product Unavailable)    06/28/2023  Postponed until 6/30/2024 by Ann Riddle MA (Patient Refused)    11/11/2021  Imm Admin: COVID-19 (MODERNA) 1st,2nd,3rd Dose Monovalent    03/05/2021  Imm Admin: COVID-19 (MODERNA) 1st,2nd,3rd Dose Monovalent    02/05/2021  Imm Admin: COVID-19 (MODERNA) 1st,2nd,3rd Dose Monovalent     Only the first 5 history entries have been loaded, but more history exists.            TDAP/TD VACCINES (2 - Td or Tdap) Overdue since 10/15/2024      10/15/2014  Imm Admin: Tdap              Pneumococcal Vaccine 50+ (1 of 1 - PCV) Never done      No completion, postpone, or frequency change history exists for this topic.              ZOSTER VACCINE (1 of 2) Never done     No completion, postpone, or frequency change history exists for this topic.                      Needs Review       COLORECTAL CANCER SCREENING (COLONOSCOPY - Every 10 Years) Tentatively due on 7/28/2032 07/28/2022  SCANNED - COLONOSCOPY    11/21/2017  SCANNED - COLONOSCOPY                      Upcoming        INFLUENZA VACCINE (Yearly - July to March) Next due on 7/1/2025 11/27/2024  Postponed until 3/31/2025 by Komal Levine MA (Patient Refused)    10/22/2018  Imm Admin: Fluzone Quad >6mos (Multi-dose)    10/15/2018  Imm Admin: Flu Vaccine Intradermal Quad 18-64YR    09/03/2016  Imm Admin: FluMist 2-49yrs    09/30/2015  Imm Admin: FluMist 2-49yrs     Only the first 5 history entries have been loaded, but more history exists.                    Completed or No Longer Recommended       HEPATITIS C SCREENING  Completed      10/17/2016  Hep C Virus Ab component of Hepatitis C Antibody                             Subjective      Review of Systems:   Review of Systems - See HPI    Past Medical History:   Past Medical History:   Diagnosis Date    Allergic     seasonal allergies    Allergic rhinitis Lifelong    especially fall    Anal fissure 2006    Chronic gastric erosion 2014    EGD positive for gastritis and esophagitis    Colon adenomas 2007    With rectal adenoma    Colon polyp 2007    polyps removed    GERD (gastroesophageal reflux disease) 2006    Severe refractory    IBS (irritable bowel syndrome) 2006    Overactive bladder 2013    Pelvic pain 2010    Negative urologic workup    Prediabetes 2017    Hemoglobin A1c 6.2    [facility] 08/18/2016    Shingles 05/2018    Right T10 and T12 dermatomes       Past Surgical History:   Past Surgical History:   Procedure Laterality Date    APPENDECTOMY  1992    CHOLECYSTECTOMY  2006    COLONOSCOPY  2017    Normal study    COLONOSCOPY W/ BIOPSIES  2022    COLONOSCOPY W/ POLYPECTOMY  2007    With rectal adenoma    KNEE ARTHROSCOPY Right 2016    ACL repair    KNEE SURGERY Left 2008    ACL repair    UPPER GASTROINTESTINAL ENDOSCOPY  2016, 2019       Family History:   Family History   Problem Relation Age of Onset    Diabetes type II Mother 60    No Known Problems Father     No Known Problems Sister     No Known Problems Brother     Prostate cancer Maternal Grandfather      Lung cancer Paternal Grandfather     Breast cancer Neg Hx     Ovarian cancer Neg Hx        Social History:   Social History     Socioeconomic History    Marital status:    Tobacco Use    Smoking status: Former     Current packs/day: 0.00     Average packs/day: 0.1 packs/day for 10.0 years (1.0 ttl pk-yrs)     Types: Cigarettes     Start date: 1997     Quit date: 2007     Years since quittin.4    Smokeless tobacco: Never   Vaping Use    Vaping status: Never Used   Substance and Sexual Activity    Alcohol use: Yes     Alcohol/week: 4.0 standard drinks of alcohol     Types: 4 Glasses of wine per week     Comment: light social drinking    Drug use: No    Sexual activity: Yes     Partners: Female     Comment:        Social History     Social History Narrative    Domestic life     lives with wife and daughter        [employer]    [employer]        Sleep hygiene in bed 11 PM to 7 AM for 7 hours broken sleep         Caffeine use   1 cup of coffee or tea daily        Exercise habits    Upper body strengthening.  Elliptical Glider 3 days weekly        Diet     low-calorie diabetic diet - low in salt low in sugar -  yogurt daily        Occupation      - 50 hours weekly        Hearing  no  impairment        Vision    corrects with reading glasses         Driving    no limitation        Tobacco History:   Social History     Tobacco Use   Smoking Status Former    Current packs/day: 0.00    Average packs/day: 0.1 packs/day for 10.0 years (1.0 ttl pk-yrs)    Types: Cigarettes    Start date: 1997    Quit date: 2007    Years since quittin.4   Smokeless Tobacco Never       Medications:     Current Outpatient Medications:     albuterol sulfate  (90 Base) MCG/ACT inhaler, Inhale 2 puffs Every 4 (Four) Hours As Needed for Wheezing or Shortness of Air., Disp: 8.5 g, Rfl: 0    ALLERGY SERUM INJECTION, Inject  under the skin into the appropriate area as directed 1 (One) Time.,  "Disp: , Rfl:     azelastine (ASTELIN) 0.1 % nasal spray, USE 1 SPRAY IN EACH NOSTRIL EVERY EVENING, Disp: , Rfl:     calcium carbonate (TUMS) 500 MG chewable tablet, Chew 1 tablet As Needed., Disp: , Rfl:     ciprofloxacin-hydrocortisone (Cipro HC) 0.2-1 % otic suspension, Administer 3 drops into ear(s) as directed by provider 2 (Two) Times a Day., Disp: 10 mL, Rfl: 0    Dexilant 60 MG capsule, Take 1 capsule by mouth Daily., Disp: 90 capsule, Rfl: 0    diclofenac (VOLTAREN) 1 % gel gel, apply 2 grams to affected area ON UPPER EXTREMITIES four times a ...  (REFER TO PRESCRIPTION NOTES)., Disp: , Rfl: 0    Multiple Vitamins-Minerals (MENS MULTI VITAMIN & MINERAL PO), Take 1 tablet by mouth Every Morning., Disp: , Rfl:     fluticasone (FLOVENT HFA) 110 MCG/ACT inhaler, Inhale 1 puff 2 (Two) Times a Day. (Patient not taking: Reported on 5/29/2025), Disp: 1 each, Rfl: 11    Triamcinolone Acetonide (NASACORT) 55 MCG/ACT nasal inhaler, USE 1 SPRAY IN EACH NOSTRIL EVERY MORNING (Patient not taking: Reported on 5/29/2025), Disp: , Rfl:     Immunizations:   Immunization History   Administered Date(s) Administered    COVID-19 (MODERNA) 1st,2nd,3rd Dose Monovalent 02/05/2021, 03/05/2021, 11/11/2021    Flu Vaccine Intradermal Quad 18-64YR 10/15/2018    FluMist 2-49yrs 09/30/2015, 09/03/2016    Fluzone Quad >6mos (Multi-dose) 10/22/2018    Tdap 10/15/2014        Allergies:   Allergies   Allergen Reactions    No Known Drug Allergy        Objective   Objective     Physical Exam:  Vital Signs:   Vitals:    05/29/25 1350   BP: 118/82   Pulse: 82   SpO2: 100%   Weight: 99.6 kg (219 lb 9.6 oz)   Height: 188 cm (74.02\")     Body mass index is 28.18 kg/m².   Facility age limit for growth %rian is 20 years.   Physical Exam  Nursing note reviewed  Const: NAD, Pleasant, Cooperative  Eyes: EOMI, no conjunctivitis  ENT: No nasal discharge present, neck supple  Cardiac: Regular rate and rhythm, no cyanosis  PHQ-2 Depression Screening  Little " interest or pleasure in doing things? Not at all   Feeling down, depressed, or hopeless? Not at all   PHQ-2 Total Score 0      Procedures/Radiology     Procedures  No radiology results for the last 7 days   The following data was reviewed by: Aditya Collado DO on 05/29/2025:  CMP          5/21/2025    08:11   CMP   Glucose 110    BUN 14    Creatinine 1.11    EGFR 80.4    Sodium 140    Potassium 4.7    Chloride 105    Calcium 9.3    Total Protein 7.0    Albumin 4.3    Globulin 2.7    Total Bilirubin 0.5    Alkaline Phosphatase 50    AST (SGOT) 28    ALT (SGPT) 28    Albumin/Globulin Ratio 1.6    BUN/Creatinine Ratio 12.6    Anion Gap 10.0      CBC w/diff          5/21/2025    08:11   CBC w/Diff   WBC 7.09    RBC 4.73    Hemoglobin 14.9    Hematocrit 43.5    MCV 92.0    MCH 31.5    MCHC 34.3    RDW 12.8    Platelets 224    Neutrophil Rel % 43.1    Immature Granulocyte Rel % 0.3    Lymphocyte Rel % 47.0    Monocyte Rel % 8.3    Eosinophil Rel % 0.7    Basophil Rel % 0.6      Lipid Panel          5/21/2025    08:11   Lipid Panel   Total Cholesterol 174    Triglycerides 65    HDL Cholesterol 52    VLDL Cholesterol 12    LDL Cholesterol  110    LDL/HDL Ratio 2.10      TSH          5/21/2025    08:11   TSH   TSH 2.230      A1C Last 3 Results          5/21/2025    08:11   HGBA1C Last 3 Results   Hemoglobin A1C 5.90      UA          5/21/2025    08:11   Urinalysis   Specific [place], UA 1.018    Ketones, UA Negative    Blood, UA Negative    Leukocytes, UA Negative    Nitrite, UA Negative      PSA          5/21/2025    08:11   PSA   PSA 1.090        Assessment & Plan   Assessment / Plan      Assessment/Plan:   Problems Addressed This Visit  Diagnoses and all orders for this visit:    1. Preventative health care (Primary)  -     Lipid Panel; Future  -     High Sensitivity CRP; Future  -     Hemoglobin A1c; Future  -     Comprehensive Metabolic Panel; Future  -     CBC & Differential; Future  -     Urinalysis With  Microscopic If Indicated (No Culture) - Urine, Clean Catch; Future  -     TSH Rfx On Abnormal To Free T4; Future  -     Vitamin B12; Future  -     Magnesium; Future    2. Gastroesophageal reflux disease without esophagitis  -     Magnesium; Future    3. Fasting hyperglycemia    4. Screening for hyperlipidemia  -     Lipid Panel; Future    5. Screening for cardiovascular condition  -     High Sensitivity CRP; Future    6. Screening for endocrine disorder  -     Hemoglobin A1c; Future  -     Comprehensive Metabolic Panel; Future  -     TSH Rfx On Abnormal To Free T4; Future    7. Screening for deficiency anemia  -     CBC & Differential; Future    8. Screening for blood or protein in urine  -     Urinalysis With Microscopic If Indicated (No Culture) - Urine, Clean Catch; Future    9. Vitamin deficiency  -     Vitamin B12; Future      Problem List Items Addressed This Visit          Endocrine and Metabolic    Fasting hyperglycemia       Gastrointestinal Abdominal     Gastroesophageal reflux disease    Relevant Orders    Magnesium       Health Encounters    Preventative health care - Primary    Relevant Orders    Lipid Panel    High Sensitivity CRP    Hemoglobin A1c    Comprehensive Metabolic Panel    CBC & Differential    Urinalysis With Microscopic If Indicated (No Culture) - Urine, Clean Catch    TSH Rfx On Abnormal To Free T4    Vitamin B12    Magnesium     Other Visit Diagnoses         Screening for hyperlipidemia        Relevant Orders    Lipid Panel      Screening for cardiovascular condition        Relevant Orders    High Sensitivity CRP      Screening for endocrine disorder        Relevant Orders    Hemoglobin A1c    Comprehensive Metabolic Panel    TSH Rfx On Abnormal To Free T4      Screening for deficiency anemia        Relevant Orders    CBC & Differential      Screening for blood or protein in urine        Relevant Orders    Urinalysis With Microscopic If Indicated (No Culture) - Urine, Clean Catch       Vitamin deficiency        Relevant Orders    Vitamin B12            See patient diagnoses and orders along with patient instructions for assessment, plan, and changes to care for patient.    The preventative exam has been reviewed in detail.  The patient has been fully counseled on preventative guidelines for vaccines, cancer screenings, and other health maintenance needs. The patient was counseled on maintaining a lifestyle to promote good health and to minimize chronic diseases.  The patient has been assisted with scheduling healthcare procedures for the coming year and given a written document outlining these recommendations. Age-appropriate screening measures have been ordered for the patient today as indicated above.    Patient Instructions   I would like you to have your labs done about a week prior to your next appointment.  You do not need an appointment, but I would advise to call our office a few days before you plan to have your labs done to confirm that orders are in and active.  We will discuss the results at your next scheduled visit.  -Lab services are available at any [facility] outpatient lab center, including across the street at [address] [place] [address]     Your labs should be done when you're in a fasting state.  This means you should not have anything with calories for at least 10 hours prior to the blood draw. Water and black coffee are fine. Both blood sugar and triglyceride levels rise after meals, and this may impact your results. Hormone levels can also change, so unless you are specifically told you do not have to fast for labs, it is better to be fasting.    Additionally, stop any multivitamins or B vitamins (especially biotin) at least 48 hours before your labs since these can interfere with accurate measurement.     Follow Up:   Return in about 1 year (around 5/29/2026) for Annual, (fasting blood work 1 week prior to appt).    DO JOSE JUAN Hudson RD  [facility]  [facility]  [address] [facility] [phone]  Fax [phone]  Phone [phone]    Disclaimer to patients: The 21st Century Cares Act makes medical notes like these available to patients in the interest of transparency. However, please be advised that this is still a medical document. It is intended as [id number] communication. Many sections may include medical language or jargon, abbreviations, and additional verbiage that are unfamiliar or confusing. In some ways it may come across as blunt, direct, or may be summarized in order to clearly and concisely communicate the most crucial information to medical professionals. It may also include mentions of conditions that are unlikely but considered as part of the differential diagnosis, including serious disorders. These are not always discussed at length at the time of appointment because their likelihood is so low, but may be included in a medical note to make it clear what has been considered and/or ruled out as part of a work-up. Medical documents are intended to carry relevant information, facts as evident, and the personal clinical opinion of the physician. If you have any questions regarding this medical document, please bring them to the attention of the physician at your next scheduled appointment.

## 2025-07-25 DIAGNOSIS — H60.331 ACUTE SWIMMER'S EAR OF RIGHT SIDE: ICD-10-CM

## 2025-07-25 RX ORDER — CIPROFLOXACIN/HYDROCORTISONE 0.2 %-1 %
3 SUSPENSION, DROPS(FINAL DOSAGE FORM)(ML) OTIC (EAR) 2 TIMES DAILY
Qty: 10 ML | Refills: 0 | Status: SHIPPED | OUTPATIENT
Start: 2025-07-25

## 2025-07-25 NOTE — TELEPHONE ENCOUNTER
Provider: DR. PRICE    [id number] - [place], [place] - [address] [facility] [address] [facility] [phone] [facility] [phone] FX    Relationship to Patient: Pharmacy    Reason for Call: CHANGED TO HIGH PRIORITY AT PATIENT'S REQUEST.

## 2025-07-28 RX ORDER — CIPROFLOXACIN AND DEXAMETHASONE 3; 1 MG/ML; MG/ML
SUSPENSION/ DROPS AURICULAR (OTIC)
Qty: 7.5 ML | Refills: 1 | Status: SHIPPED | OUTPATIENT
Start: 2025-07-28